# Patient Record
Sex: FEMALE | Race: BLACK OR AFRICAN AMERICAN | NOT HISPANIC OR LATINO
[De-identification: names, ages, dates, MRNs, and addresses within clinical notes are randomized per-mention and may not be internally consistent; named-entity substitution may affect disease eponyms.]

---

## 2019-06-05 ENCOUNTER — RESULT REVIEW (OUTPATIENT)
Age: 34
End: 2019-06-05

## 2019-06-20 ENCOUNTER — APPOINTMENT (OUTPATIENT)
Dept: HEPATOLOGY | Facility: CLINIC | Age: 34
End: 2019-06-20

## 2019-06-20 PROBLEM — Z00.00 ENCOUNTER FOR PREVENTIVE HEALTH EXAMINATION: Status: ACTIVE | Noted: 2019-06-20

## 2019-07-01 ENCOUNTER — APPOINTMENT (OUTPATIENT)
Dept: HEPATOLOGY | Facility: CLINIC | Age: 34
End: 2019-07-01
Payer: MEDICAID

## 2019-07-01 VITALS
RESPIRATION RATE: 16 BRPM | HEIGHT: 63 IN | BODY MASS INDEX: 26.22 KG/M2 | DIASTOLIC BLOOD PRESSURE: 78 MMHG | SYSTOLIC BLOOD PRESSURE: 119 MMHG | TEMPERATURE: 98.3 F | HEART RATE: 99 BPM | OXYGEN SATURATION: 98 % | WEIGHT: 148 LBS

## 2019-07-01 DIAGNOSIS — Z86.2 PERSONAL HISTORY OF DISEASES OF THE BLOOD AND BLOOD-FORMING ORGANS AND CERTAIN DISORDERS INVOLVING THE IMMUNE MECHANISM: ICD-10-CM

## 2019-07-01 PROCEDURE — 99203 OFFICE O/P NEW LOW 30 MIN: CPT

## 2019-07-02 LAB
ALBUMIN SERPL ELPH-MCNC: 4.3 G/DL
ALP BLD-CCNC: 63 U/L
ALT SERPL-CCNC: 25 U/L
ANION GAP SERPL CALC-SCNC: 11 MMOL/L
AST SERPL-CCNC: 20 U/L
BASOPHILS # BLD AUTO: 0.04 K/UL
BASOPHILS NFR BLD AUTO: 0.4 %
BILIRUB SERPL-MCNC: 0.2 MG/DL
BUN SERPL-MCNC: 9 MG/DL
CALCIUM SERPL-MCNC: 9.3 MG/DL
CHLORIDE SERPL-SCNC: 105 MMOL/L
CO2 SERPL-SCNC: 21 MMOL/L
CREAT SERPL-MCNC: 0.51 MG/DL
EOSINOPHIL # BLD AUTO: 0.09 K/UL
EOSINOPHIL NFR BLD AUTO: 0.9 %
GLUCOSE SERPL-MCNC: 84 MG/DL
HBV CORE IGG+IGM SER QL: REACTIVE
HBV DNA # SERPL NAA+PROBE: 186 IU/ML
HBV SURFACE AB SER QL: NONREACTIVE
HBV SURFACE AG SER QL: REACTIVE
HCT VFR BLD CALC: 35.3 %
HEPATITIS A IGG ANTIBODY: REACTIVE
HEPB DNA PCR LOG: 2.27
HGB BLD-MCNC: 10.7 G/DL
IMM GRANULOCYTES NFR BLD AUTO: 1.3 %
LYMPHOCYTES # BLD AUTO: 1.66 K/UL
LYMPHOCYTES NFR BLD AUTO: 17.2 %
MAN DIFF?: NORMAL
MCHC RBC-ENTMCNC: 23.6 PG
MCHC RBC-ENTMCNC: 30.3 GM/DL
MCV RBC AUTO: 77.9 FL
MONOCYTES # BLD AUTO: 1.57 K/UL
MONOCYTES NFR BLD AUTO: 16.2 %
NEUTROPHILS # BLD AUTO: 6.18 K/UL
NEUTROPHILS NFR BLD AUTO: 64 %
PLATELET # BLD AUTO: 361 K/UL
POTASSIUM SERPL-SCNC: 4.1 MMOL/L
PROT SERPL-MCNC: 7.2 G/DL
RBC # BLD: 4.53 M/UL
RBC # FLD: 17 %
SODIUM SERPL-SCNC: 137 MMOL/L
WBC # FLD AUTO: 9.67 K/UL

## 2019-07-03 LAB
HBV E AB SER QL: POSITIVE
HBV E AG SER QL: NEGATIVE

## 2019-07-08 LAB — HDV AB SER IA-ACNC: NEGATIVE

## 2019-07-09 ENCOUNTER — ASOB RESULT (OUTPATIENT)
Age: 34
End: 2019-07-09

## 2019-07-09 ENCOUNTER — APPOINTMENT (OUTPATIENT)
Dept: ANTEPARTUM | Facility: CLINIC | Age: 34
End: 2019-07-09
Payer: MEDICAID

## 2019-07-09 PROCEDURE — 76813 OB US NUCHAL MEAS 1 GEST: CPT

## 2019-07-09 PROCEDURE — 36416 COLLJ CAPILLARY BLOOD SPEC: CPT

## 2019-07-09 PROCEDURE — 76801 OB US < 14 WKS SINGLE FETUS: CPT

## 2019-07-12 ENCOUNTER — APPOINTMENT (OUTPATIENT)
Dept: ULTRASOUND IMAGING | Facility: HOSPITAL | Age: 34
End: 2019-07-12

## 2019-08-07 ENCOUNTER — ASOB RESULT (OUTPATIENT)
Age: 34
End: 2019-08-07

## 2019-08-07 ENCOUNTER — APPOINTMENT (OUTPATIENT)
Dept: ANTEPARTUM | Facility: CLINIC | Age: 34
End: 2019-08-07
Payer: MEDICAID

## 2019-08-07 ENCOUNTER — APPOINTMENT (OUTPATIENT)
Dept: ANTEPARTUM | Facility: CLINIC | Age: 34
End: 2019-08-07

## 2019-08-07 PROCEDURE — 99241 OFFICE CONSULTATION NEW/ESTAB PATIENT 15 MIN: CPT | Mod: 25

## 2019-08-07 PROCEDURE — 36415 COLL VENOUS BLD VENIPUNCTURE: CPT

## 2019-08-20 ENCOUNTER — APPOINTMENT (OUTPATIENT)
Dept: ULTRASOUND IMAGING | Facility: CLINIC | Age: 34
End: 2019-08-20

## 2019-08-22 ENCOUNTER — FORM ENCOUNTER (OUTPATIENT)
Age: 34
End: 2019-08-22

## 2019-08-23 ENCOUNTER — OUTPATIENT (OUTPATIENT)
Dept: OUTPATIENT SERVICES | Facility: HOSPITAL | Age: 34
LOS: 1 days | End: 2019-08-23
Payer: COMMERCIAL

## 2019-08-23 ENCOUNTER — APPOINTMENT (OUTPATIENT)
Dept: ULTRASOUND IMAGING | Facility: CLINIC | Age: 34
End: 2019-08-23
Payer: MEDICAID

## 2019-08-23 DIAGNOSIS — B18.1 CHRONIC VIRAL HEPATITIS B WITHOUT DELTA-AGENT: ICD-10-CM

## 2019-08-23 PROCEDURE — 76700 US EXAM ABDOM COMPLETE: CPT | Mod: 26

## 2019-08-23 PROCEDURE — 76700 US EXAM ABDOM COMPLETE: CPT

## 2019-08-27 ENCOUNTER — ASOB RESULT (OUTPATIENT)
Age: 34
End: 2019-08-27

## 2019-08-27 ENCOUNTER — APPOINTMENT (OUTPATIENT)
Dept: ANTEPARTUM | Facility: CLINIC | Age: 34
End: 2019-08-27
Payer: MEDICAID

## 2019-08-27 PROCEDURE — 76811 OB US DETAILED SNGL FETUS: CPT

## 2019-09-04 ENCOUNTER — ASOB RESULT (OUTPATIENT)
Age: 34
End: 2019-09-04

## 2019-09-04 ENCOUNTER — APPOINTMENT (OUTPATIENT)
Dept: ANTEPARTUM | Facility: CLINIC | Age: 34
End: 2019-09-04
Payer: MEDICAID

## 2019-09-04 PROCEDURE — 76815 OB US LIMITED FETUS(S): CPT

## 2019-09-04 PROCEDURE — 93975 VASCULAR STUDY: CPT

## 2019-10-30 ENCOUNTER — APPOINTMENT (OUTPATIENT)
Dept: ANTEPARTUM | Facility: CLINIC | Age: 34
End: 2019-10-30
Payer: MEDICAID

## 2019-10-30 ENCOUNTER — ASOB RESULT (OUTPATIENT)
Age: 34
End: 2019-10-30

## 2019-10-30 PROCEDURE — 99242 OFF/OP CONSLTJ NEW/EST SF 20: CPT | Mod: 25

## 2019-10-30 PROCEDURE — 99212 OFFICE O/P EST SF 10 MIN: CPT | Mod: 25

## 2019-10-30 PROCEDURE — 76819 FETAL BIOPHYS PROFIL W/O NST: CPT

## 2019-10-30 PROCEDURE — 76816 OB US FOLLOW-UP PER FETUS: CPT

## 2019-12-04 ENCOUNTER — APPOINTMENT (OUTPATIENT)
Dept: HEPATOLOGY | Facility: CLINIC | Age: 34
End: 2019-12-04
Payer: MEDICAID

## 2019-12-04 ENCOUNTER — LABORATORY RESULT (OUTPATIENT)
Age: 34
End: 2019-12-04

## 2019-12-04 VITALS
BODY MASS INDEX: 31.71 KG/M2 | DIASTOLIC BLOOD PRESSURE: 72 MMHG | HEART RATE: 109 BPM | WEIGHT: 179 LBS | TEMPERATURE: 98.4 F | HEIGHT: 63 IN | SYSTOLIC BLOOD PRESSURE: 123 MMHG | RESPIRATION RATE: 16 BRPM

## 2019-12-04 DIAGNOSIS — Z3A.34 34 WEEKS GESTATION OF PREGNANCY: ICD-10-CM

## 2019-12-04 PROCEDURE — 99213 OFFICE O/P EST LOW 20 MIN: CPT

## 2019-12-05 PROBLEM — Z3A.34 34 WEEKS GESTATION OF PREGNANCY: Status: ACTIVE | Noted: 2019-12-05

## 2019-12-05 LAB
ALBUMIN SERPL ELPH-MCNC: 3.7 G/DL
ALP BLD-CCNC: 97 U/L
ALT SERPL-CCNC: 25 U/L
ANION GAP SERPL CALC-SCNC: 12 MMOL/L
AST SERPL-CCNC: 20 U/L
BASOPHILS # BLD AUTO: 0.07 K/UL
BASOPHILS NFR BLD AUTO: 0.9 %
BILIRUB SERPL-MCNC: 0.3 MG/DL
BUN SERPL-MCNC: 5 MG/DL
CALCIUM SERPL-MCNC: 9.7 MG/DL
CHLORIDE SERPL-SCNC: 105 MMOL/L
CO2 SERPL-SCNC: 20 MMOL/L
CREAT SERPL-MCNC: 0.49 MG/DL
EOSINOPHIL # BLD AUTO: 0 K/UL
EOSINOPHIL NFR BLD AUTO: 0 %
GLUCOSE SERPL-MCNC: 85 MG/DL
HBV CORE IGG+IGM SER QL: REACTIVE
HBV DNA # SERPL NAA+PROBE: 22 IU/ML
HBV SURFACE AB SER QL: NONREACTIVE
HBV SURFACE AG SER QL: REACTIVE
HCT VFR BLD CALC: 38.8 %
HEPB DNA PCR LOG: 1.34 LOG10IU/ML
HGB BLD-MCNC: 11.7 G/DL
LYMPHOCYTES # BLD AUTO: 1.84 K/UL
LYMPHOCYTES NFR BLD AUTO: 23.9 %
MAN DIFF?: NORMAL
MCHC RBC-ENTMCNC: 25.3 PG
MCHC RBC-ENTMCNC: 30.2 GM/DL
MCV RBC AUTO: 84 FL
MONOCYTES # BLD AUTO: 1.97 K/UL
MONOCYTES NFR BLD AUTO: 25.6 %
NEUTROPHILS # BLD AUTO: 3.69 K/UL
NEUTROPHILS NFR BLD AUTO: 47.9 %
PLATELET # BLD AUTO: 220 K/UL
POTASSIUM SERPL-SCNC: 4.3 MMOL/L
PROT SERPL-MCNC: 6.8 G/DL
RBC # BLD: 4.62 M/UL
RBC # FLD: 17.8 %
SODIUM SERPL-SCNC: 137 MMOL/L
WBC # FLD AUTO: 7.71 K/UL

## 2019-12-11 ENCOUNTER — ASOB RESULT (OUTPATIENT)
Age: 34
End: 2019-12-11

## 2019-12-11 ENCOUNTER — APPOINTMENT (OUTPATIENT)
Dept: ANTEPARTUM | Facility: CLINIC | Age: 34
End: 2019-12-11
Payer: MEDICAID

## 2019-12-11 PROCEDURE — 99213 OFFICE O/P EST LOW 20 MIN: CPT | Mod: 25

## 2019-12-11 PROCEDURE — 76816 OB US FOLLOW-UP PER FETUS: CPT

## 2019-12-23 ENCOUNTER — APPOINTMENT (OUTPATIENT)
Dept: CARDIOLOGY | Facility: CLINIC | Age: 34
End: 2019-12-23
Payer: MEDICAID

## 2019-12-23 ENCOUNTER — NON-APPOINTMENT (OUTPATIENT)
Age: 34
End: 2019-12-23

## 2019-12-23 ENCOUNTER — APPOINTMENT (OUTPATIENT)
Dept: PULMONOLOGY | Facility: CLINIC | Age: 34
End: 2019-12-23
Payer: MEDICAID

## 2019-12-23 VITALS
HEIGHT: 63 IN | WEIGHT: 183 LBS | TEMPERATURE: 98 F | RESPIRATION RATE: 15 BRPM | SYSTOLIC BLOOD PRESSURE: 116 MMHG | BODY MASS INDEX: 32.43 KG/M2 | HEART RATE: 108 BPM | DIASTOLIC BLOOD PRESSURE: 69 MMHG | OXYGEN SATURATION: 96 %

## 2019-12-23 VITALS
SYSTOLIC BLOOD PRESSURE: 118 MMHG | DIASTOLIC BLOOD PRESSURE: 68 MMHG | WEIGHT: 182 LBS | HEIGHT: 63 IN | HEART RATE: 100 BPM | BODY MASS INDEX: 32.25 KG/M2 | OXYGEN SATURATION: 98 % | RESPIRATION RATE: 16 BRPM

## 2019-12-23 DIAGNOSIS — R06.02 SHORTNESS OF BREATH: ICD-10-CM

## 2019-12-23 PROCEDURE — 99204 OFFICE O/P NEW MOD 45 MIN: CPT

## 2019-12-23 PROCEDURE — 93306 TTE W/DOPPLER COMPLETE: CPT

## 2019-12-23 PROCEDURE — 93000 ELECTROCARDIOGRAM COMPLETE: CPT

## 2019-12-23 NOTE — CONSULT LETTER
[Dear  ___] : Dear  [unfilled], [Consult Letter:] : I had the pleasure of evaluating your patient, [unfilled]. [Consult Closing:] : Thank you very much for allowing me to participate in the care of this patient.  If you have any questions, please do not hesitate to contact me. [Please see my note below.] : Please see my note below. [Sincerely,] : Sincerely, [FreeTextEntry3] : Cindy Mata MD\par Pulmonary, Critical Care, and Sleep Medicine\par  of Medicine\par [employer] [facility]

## 2019-12-23 NOTE — REVIEW OF SYSTEMS
[Dyspnea] : dyspnea [As Noted in HPI] : as noted in HPI [Negative] : Pulmonary Hypertension [Cough] : no cough [Sputum] : not coughing up ~M sputum [Chest Tightness] : no chest tightness [Pleuritic Pain] : no pleuritic pain [Wheezing] : no wheezing [Dizziness] : no dizziness [Headache] : no headache [Syncope] : no fainting

## 2019-12-23 NOTE — PHYSICAL EXAM
[Clean] : clean [Dry] : dry [Well-Healed] : well-healed [General Appearance - Well Nourished] : well nourished [General Appearance - Well Developed] : well developed [Normal Appearance] : normal appearance [Well Groomed] : well groomed [General Appearance - In No Acute Distress] : no acute distress [No Deformities] : no deformities [Eyelids - No Xanthelasma] : the eyelids demonstrated no xanthelasmas [Normal Conjunctiva] : the conjunctiva exhibited no abnormalities [No Oral Pallor] : no oral pallor [Normal Oral Mucosa] : normal oral mucosa [No Oral Cyanosis] : no oral cyanosis [Normal Jugular Venous V Waves Present] : normal jugular venous V waves present [Normal Jugular Venous A Waves Present] : normal jugular venous A waves present [No Jugular Venous Issa A Waves] : no jugular venous issa A waves [Heart Rate And Rhythm] : heart rate and rhythm were normal [Murmurs] : no murmurs present [Heart Sounds] : normal S1 and S2 [Respiration, Rhythm And Depth] : normal respiratory rhythm and effort [Abdomen Soft] : soft [Auscultation Breath Sounds / Voice Sounds] : lungs were clear to auscultation bilaterally [Exaggerated Use Of Accessory Muscles For Inspiration] : no accessory muscle use [Abdomen Tenderness] : non-tender [Abdomen Mass (___ Cm)] : no abdominal mass palpated [Abnormal Walk] : normal gait [Gait - Sufficient For Exercise Testing] : the gait was sufficient for exercise testing [Cyanosis, Localized] : no localized cyanosis [Nail Clubbing] : no clubbing of the fingernails [Petechial Hemorrhages (___cm)] : no petechial hemorrhages [Skin Color & Pigmentation] : normal skin color and pigmentation [No Venous Stasis] : no venous stasis [] : no rash [No Skin Ulcers] : no skin ulcer [Skin Lesions] : no skin lesions [No Xanthoma] : no  xanthoma was observed [Oriented To Time, Place, And Person] : oriented to person, place, and time [Affect] : the affect was normal [No Anxiety] : not feeling anxious [Mood] : the mood was normal [FreeTextEntry1] : Regular rate and rhythm, NL S1, S2, non-displaced PMI, chest non-tender; no rubs,heaves  or gallops a  Grade 1/6 systolic murmur noted at the LSB\par

## 2019-12-23 NOTE — PROCEDURE
[FreeTextEntry1] : Room air at rest 98%.\par Room air with ambulation 98%.\par \par POCUS:\par Chest - Alines throughout. No pleural effusions or consolidations bilaterally. Diaphragm is high near axilla due to gravid uterus. Normal LV function with no septal flattening or sign of RV overload.\par DVT study - Fully compressible femoral vein bilaterally at CFV at site of CFA, saphenous vein junction, and deep femoral vein split, as well as popliteal vein

## 2019-12-23 NOTE — PHYSICAL EXAM
[General Appearance - Well Developed] : well developed [Normal Appearance] : normal appearance [Well Groomed] : well groomed [General Appearance - Well Nourished] : well nourished [No Deformities] : no deformities [General Appearance - In No Acute Distress] : no acute distress [Normal Conjunctiva] : the conjunctiva exhibited no abnormalities [Neck Appearance] : the appearance of the neck was normal [Murmurs] : no murmurs present [Heart Rate And Rhythm] : heart rate and rhythm were normal [Arterial Pulses Normal] : the arterial pulses were normal [Respiration, Rhythm And Depth] : normal respiratory rhythm and effort [Exaggerated Use Of Accessory Muscles For Inspiration] : no accessory muscle use [Auscultation Breath Sounds / Voice Sounds] : lungs were clear to auscultation bilaterally [Gait - Sufficient For Exercise Testing] : the gait was sufficient for exercise testing [Abnormal Walk] : normal gait [Nail Splinter Hemorrhages] : no splinter hemorrhages of the nails [Nail Clubbing] : no clubbing of the fingernails [Petechial Hemorrhages (___cm)] : no petechial hemorrhages [Skin Turgor] : normal skin turgor [No Focal Deficits] : no focal deficits [] : no rash [Skin Color & Pigmentation] : normal skin color and pigmentation [Impaired Insight] : insight and judgment were intact [Oriented To Time, Place, And Person] : oriented to person, place, and time [Mood] : the mood was normal [Affect] : the affect was normal [FreeTextEntry1] : Gravd uterus [FreeTextEntry2] : No edema

## 2019-12-23 NOTE — REASON FOR VISIT
[FreeTextEntry1] : pt is 34 weeks  pregnant  female who is SOB since October . pt states she sleeping sitting up for the last several weeks.  Patient states that she had no symptoms like this during her first pregnancy.  She denies substernal chest pain palpitations,  or leg edema.\par . pt has had normal OB / GYN  visits

## 2019-12-23 NOTE — HISTORY OF PRESENT ILLNESS
[FreeTextEntry1] : 34 year old female referred by Dr. Londono for evaluation of dyspnea.\par \par Since October. \par Patient is pregnant, 34 weeks gestation, due date . This is patient's second pregnancy - first pregnancy was 12 years ago, no complication during that pregnancy but was delivered emergently at 36 weeks via  due to  distress.\par \par She has had no medical complications during this pregnancy but reports dyspnea since October. No significant inciting factor but with progressively worsening dyspnea. No chest pain, no fever, no leg edema, no wheezing, no coughing, no dizziness, no lightheadedness. Has difficulty lying flat. Last travel was in May to [facility], 15 hour travel in total, in two 7 hour trips with a layover. No dyspnea around that trip. Last visit with OB was on  - fetal heart rate was fine. Last fetal US , baby weighed about 6 lbs at that time. Has CBC check  which showed Hgb of 11.7.\par

## 2019-12-23 NOTE — PHYSICAL EXAM
[Clean] : clean [Dry] : dry [Well-Healed] : well-healed [General Appearance - Well Developed] : well developed [Well Groomed] : well groomed [General Appearance - Well Nourished] : well nourished [Normal Appearance] : normal appearance [General Appearance - In No Acute Distress] : no acute distress [No Deformities] : no deformities [Eyelids - No Xanthelasma] : the eyelids demonstrated no xanthelasmas [Normal Conjunctiva] : the conjunctiva exhibited no abnormalities [Normal Oral Mucosa] : normal oral mucosa [No Oral Pallor] : no oral pallor [Normal Jugular Venous V Waves Present] : normal jugular venous V waves present [No Oral Cyanosis] : no oral cyanosis [Normal Jugular Venous A Waves Present] : normal jugular venous A waves present [No Jugular Venous Issa A Waves] : no jugular venous issa A waves [Heart Rate And Rhythm] : heart rate and rhythm were normal [Heart Sounds] : normal S1 and S2 [Murmurs] : no murmurs present [Respiration, Rhythm And Depth] : normal respiratory rhythm and effort [Abdomen Soft] : soft [Exaggerated Use Of Accessory Muscles For Inspiration] : no accessory muscle use [Auscultation Breath Sounds / Voice Sounds] : lungs were clear to auscultation bilaterally [Abdomen Tenderness] : non-tender [Gait - Sufficient For Exercise Testing] : the gait was sufficient for exercise testing [Abnormal Walk] : normal gait [Abdomen Mass (___ Cm)] : no abdominal mass palpated [Nail Clubbing] : no clubbing of the fingernails [Cyanosis, Localized] : no localized cyanosis [Petechial Hemorrhages (___cm)] : no petechial hemorrhages [No Venous Stasis] : no venous stasis [] : no rash [Skin Color & Pigmentation] : normal skin color and pigmentation [No Skin Ulcers] : no skin ulcer [Skin Lesions] : no skin lesions [Oriented To Time, Place, And Person] : oriented to person, place, and time [Affect] : the affect was normal [No Xanthoma] : no  xanthoma was observed [No Anxiety] : not feeling anxious [Mood] : the mood was normal [FreeTextEntry1] : Regular rate and rhythm, NL S1, S2, non-displaced PMI, chest non-tender; no rubs,heaves  or gallops a  Grade 1/6 systolic murmur noted at the LSB\par

## 2019-12-23 NOTE — ASSESSMENT
[FreeTextEntry1] : 34 year old female, 34 weeks gestation with dyspnea. \par No significant anemia noted on recent labs. No wheezing to suggest airway disease. POCUS negative for pulmonary edema, pleural effusions, or consolidations. No sign of RV overload. DVT study negative. Given negative DVT, with no evidence of RV overload and normal oxygen saturation, have low clinical suspicion for PE. I suspect her dyspnea is related to pregnancy. Recommended to avoid supine sleep, and keep on left side if possible. If she has worsening of symptoms or develops chest pain, dizziness, lightheadedness, etc. She was advised to go to the ED.\par \par She can follow up with me on an as needed basis.

## 2020-01-08 ENCOUNTER — APPOINTMENT (OUTPATIENT)
Dept: ANTEPARTUM | Facility: CLINIC | Age: 35
End: 2020-01-08
Payer: MEDICAID

## 2020-01-08 ENCOUNTER — ASOB RESULT (OUTPATIENT)
Age: 35
End: 2020-01-08

## 2020-01-08 PROCEDURE — 76816 OB US FOLLOW-UP PER FETUS: CPT

## 2020-01-08 PROCEDURE — 76819 FETAL BIOPHYS PROFIL W/O NST: CPT

## 2020-01-13 ENCOUNTER — OUTPATIENT (OUTPATIENT)
Dept: OUTPATIENT SERVICES | Facility: HOSPITAL | Age: 35
LOS: 1 days | End: 2020-01-13

## 2020-01-13 VITALS
OXYGEN SATURATION: 98 % | HEART RATE: 98 BPM | TEMPERATURE: 98 F | DIASTOLIC BLOOD PRESSURE: 60 MMHG | HEIGHT: 65 IN | SYSTOLIC BLOOD PRESSURE: 110 MMHG | RESPIRATION RATE: 16 BRPM | WEIGHT: 184.09 LBS

## 2020-01-13 DIAGNOSIS — Z98.891 HISTORY OF UTERINE SCAR FROM PREVIOUS SURGERY: ICD-10-CM

## 2020-01-13 DIAGNOSIS — O34.219 MATERNAL CARE FOR UNSPECIFIED TYPE SCAR FROM PREVIOUS CESAREAN DELIVERY: ICD-10-CM

## 2020-01-13 DIAGNOSIS — Z98.891 HISTORY OF UTERINE SCAR FROM PREVIOUS SURGERY: Chronic | ICD-10-CM

## 2020-01-13 LAB
APPEARANCE UR: SIGNIFICANT CHANGE UP
BACTERIA # UR AUTO: HIGH
BILIRUB UR-MCNC: NEGATIVE — SIGNIFICANT CHANGE UP
BLD GP AB SCN SERPL QL: NEGATIVE — SIGNIFICANT CHANGE UP
BLOOD UR QL VISUAL: NEGATIVE — SIGNIFICANT CHANGE UP
COLOR SPEC: SIGNIFICANT CHANGE UP
GLUCOSE UR-MCNC: NEGATIVE — SIGNIFICANT CHANGE UP
HCT VFR BLD CALC: 38.7 % — SIGNIFICANT CHANGE UP (ref 34.5–45)
HGB BLD-MCNC: 12.6 G/DL — SIGNIFICANT CHANGE UP (ref 11.5–15.5)
HYALINE CASTS # UR AUTO: NEGATIVE — SIGNIFICANT CHANGE UP
KETONES UR-MCNC: NEGATIVE — SIGNIFICANT CHANGE UP
LEUKOCYTE ESTERASE UR-ACNC: NEGATIVE — SIGNIFICANT CHANGE UP
MCHC RBC-ENTMCNC: 26 PG — LOW (ref 27–34)
MCHC RBC-ENTMCNC: 32.6 % — SIGNIFICANT CHANGE UP (ref 32–36)
MCV RBC AUTO: 80 FL — SIGNIFICANT CHANGE UP (ref 80–100)
NITRITE UR-MCNC: NEGATIVE — SIGNIFICANT CHANGE UP
NRBC # FLD: 0 K/UL — SIGNIFICANT CHANGE UP (ref 0–0)
PH UR: 6.5 — SIGNIFICANT CHANGE UP (ref 5–8)
PLATELET # BLD AUTO: 218 K/UL — SIGNIFICANT CHANGE UP (ref 150–400)
PMV BLD: 9.3 FL — SIGNIFICANT CHANGE UP (ref 7–13)
PROT UR-MCNC: NEGATIVE — SIGNIFICANT CHANGE UP
RBC # BLD: 4.84 M/UL — SIGNIFICANT CHANGE UP (ref 3.8–5.2)
RBC # FLD: 15.7 % — HIGH (ref 10.3–14.5)
RBC CASTS # UR COMP ASSIST: SIGNIFICANT CHANGE UP (ref 0–?)
RH IG SCN BLD-IMP: POSITIVE — SIGNIFICANT CHANGE UP
SP GR SPEC: 1.01 — SIGNIFICANT CHANGE UP (ref 1–1.04)
SQUAMOUS # UR AUTO: SIGNIFICANT CHANGE UP
UROBILINOGEN FLD QL: NORMAL — SIGNIFICANT CHANGE UP
WBC # BLD: 6.88 K/UL — SIGNIFICANT CHANGE UP (ref 3.8–10.5)
WBC # FLD AUTO: 6.88 K/UL — SIGNIFICANT CHANGE UP (ref 3.8–10.5)
WBC UR QL: HIGH (ref 0–?)

## 2020-01-13 NOTE — OB PST NOTE - ASSESSMENT
35 y/o female with hx of  section due to failed induction.  Now with  EDC 20, scheduled for Repeat  section 20.

## 2020-01-13 NOTE — OB PST NOTE - PAIN CHRONIC, PROFILE
yes/lower back pain for past 5 months per pt. Per pt, she was advised yes/lower back pain for past 5 months per pt. Pt reports pain level goes up to 9 at times for past 5 months

## 2020-01-13 NOTE — OB PST NOTE - NSHPREVIEWOFSYSTEMS_GEN_ALL_CORE
General: No fever, chills, sweating, anorexia, weight loss or weight gain. No polyphagia, polyurea, polydypsia, malaise, or fatigue    Skin: No rashes, itching, or dryness. No change in size/color of moles. No tumors, brittle nails, pitted nails, or hair loss    Breast: No tenderness, lumps, or nipple discharge      Ophthalmologic: No diplopia, photophobia, lacrimation, blurred Vision , or eye discharge    ENMT Symptoms: No hearing difficulty, ear pain, tinnitus, or vertigo. No sinus symptoms, nasal congestion, nasal   discharge, or nasal obstruction    Respiratory and Thorax: No wheezing, dyspnea, cough, hemoptysis, or pleuritic chest pPain     Cardiovascular: No chest pain, palpitations, dyspnea on exertion, orthopnea, paroxysmal nocturnal dyspnea,   peripheral edema, or claudication    Gastrointestinal: No nausea, vomiting, diarrhea, constipation, change in bowel habits, flatulence, abdominal pain, or melena    Genitourinary/ Pelvis: No hematuria, renal colic, or flank pain.  No urine discoloration, incontinence, dysuria, or urinary hesitancy. Normal urinary frequency. No nocturia, abnormal vaginal bleeding, vaginal discharge, spotting, pelvic pain, or vaginal leakage    Musculoskeletal:  Pt reports back pain for past 5 months. no joint swelling, muscle cramping, muscle weakness, neck pain, arm pain, or leg pain    Neurological: No transient paralysis, weakness, paresthesias, or seizures. No syncope, tremors, vertigo, loss of sensation, difficulty walking, loss of consciousness, hemiparesis, confusion, or facial palsy    Psychiatric: No suicidal ideation, depression, anxiety, insomnia, memory loss, paranoia, mood swings, agitation, hallucinations, or hyperactivity    Hematology: No gum bleeding, nose bleeding, or skin lumps    Lymphatic: No enlarged or tender lymph nodes. No extremity swelling    Endocrine: No heat or cold intolerance    Immunologic: No recurrent or persistent infections General: No fever, chills, sweating, anorexia, weight loss or weight gain. No polyphagia, polyurea, polydypsia, malaise, or fatigue    Skin: No rashes, itching, or dryness. No change in size/color of moles. No tumors, brittle nails, pitted nails, or hair loss    Breast: No tenderness, lumps, or nipple discharge      Ophthalmologic: No diplopia, photophobia, lacrimation, blurred Vision , or eye discharge    ENMT Symptoms: No hearing difficulty, ear pain, tinnitus, or vertigo. No sinus symptoms, nasal congestion, nasal   discharge, or nasal obstruction    Respiratory and Thorax: No wheezing, dyspnea, cough, hemoptysis, or pleuritic chest pPain     Cardiovascular: No chest pain, palpitations, dyspnea on exertion, orthopnea, paroxysmal nocturnal dyspnea,   peripheral edema, or claudication    Gastrointestinal: No nausea, vomiting, diarrhea, constipation, change in bowel habits, flatulence, abdominal pain, or melena    Genitourinary/ Pelvis: No hematuria, renal colic, or flank pain.  No urine discoloration, incontinence, dysuria, or urinary hesitancy. Normal urinary frequency. No nocturia, abnormal vaginal bleeding, vaginal discharge, spotting, pelvic pain, or vaginal leakage    Musculoskeletal:  Pt reports chronic lower back pain for past 5 months. no joint swelling, muscle cramping, muscle weakness, neck pain, arm pain, or leg pain    Neurological: No transient paralysis, weakness, paresthesias, or seizures. No syncope, tremors, vertigo, loss of sensation, difficulty walking, loss of consciousness, hemiparesis, confusion, or facial palsy    Psychiatric: No suicidal ideation, depression, anxiety, insomnia, memory loss, paranoia, mood swings, agitation, hallucinations, or hyperactivity    Hematology: No gum bleeding, nose bleeding, or skin lumps    Lymphatic: No enlarged or tender lymph nodes. No extremity swelling    Endocrine: No heat or cold intolerance    Immunologic: No recurrent or persistent infections

## 2020-01-13 NOTE — OB PST NOTE - HISTORY OF PRESENT ILLNESS
33 y/o female with hx of  section due to failed induction.  Now with  EDC22, scheduled for Repeat  section 20.     Pt reports she feels baby moving today as usual 35 y/o female with hx of  section due to failed induction.  Now with  EDC 20, scheduled for Repeat  section 20.     Pt reports she feels baby moving today as usual

## 2020-01-13 NOTE — OB PST NOTE - PAIN LOCATION
chronic lower back pain, right hip pain x5 months.  Pt reports OB aware. chronic lower back pain, right hip pain x5 months.  Pt reports OB aware., and advises pt to take Tylenol for pain

## 2020-01-13 NOTE — OB PST NOTE - PROBLEM SELECTOR PLAN 1
Repeat  section 20.     CBC RPR T&S UA    Preop instructions and antibacterial soap given and explained (verbal and written), with teach back.

## 2020-01-14 LAB — T PALLIDUM AB TITR SER: NEGATIVE — SIGNIFICANT CHANGE UP

## 2020-01-16 ENCOUNTER — TRANSCRIPTION ENCOUNTER (OUTPATIENT)
Age: 35
End: 2020-01-16

## 2020-01-17 ENCOUNTER — TRANSCRIPTION ENCOUNTER (OUTPATIENT)
Age: 35
End: 2020-01-17

## 2020-01-17 ENCOUNTER — INPATIENT (INPATIENT)
Facility: HOSPITAL | Age: 35
LOS: 2 days | Discharge: HOME | End: 2020-01-20
Attending: SPECIALIST | Admitting: SPECIALIST

## 2020-01-17 VITALS
WEIGHT: 186.95 LBS | HEIGHT: 65 IN | RESPIRATION RATE: 18 BRPM | DIASTOLIC BLOOD PRESSURE: 62 MMHG | TEMPERATURE: 98 F | SYSTOLIC BLOOD PRESSURE: 119 MMHG | HEART RATE: 88 BPM

## 2020-01-17 DIAGNOSIS — O34.219 MATERNAL CARE FOR UNSPECIFIED TYPE SCAR FROM PREVIOUS CESAREAN DELIVERY: ICD-10-CM

## 2020-01-17 DIAGNOSIS — Z98.890 OTHER SPECIFIED POSTPROCEDURAL STATES: Chronic | ICD-10-CM

## 2020-01-17 DIAGNOSIS — Z98.891 HISTORY OF UTERINE SCAR FROM PREVIOUS SURGERY: Chronic | ICD-10-CM

## 2020-01-17 LAB
BLD GP AB SCN SERPL QL: NEGATIVE — SIGNIFICANT CHANGE UP
HCT VFR BLD CALC: 38 % — SIGNIFICANT CHANGE UP (ref 34.5–45)
HCT VFR BLD CALC: 38.8 % — SIGNIFICANT CHANGE UP (ref 34.5–45)
HCT VFR BLD CALC: 38.9 % — SIGNIFICANT CHANGE UP (ref 34.5–45)
HGB BLD-MCNC: 12.2 G/DL — SIGNIFICANT CHANGE UP (ref 11.5–15.5)
HGB BLD-MCNC: 12.2 G/DL — SIGNIFICANT CHANGE UP (ref 11.5–15.5)
HGB BLD-MCNC: 12.4 G/DL — SIGNIFICANT CHANGE UP (ref 11.5–15.5)
MCHC RBC-ENTMCNC: 26.1 PG — LOW (ref 27–34)
MCHC RBC-ENTMCNC: 26.2 PG — LOW (ref 27–34)
MCHC RBC-ENTMCNC: 32 % — SIGNIFICANT CHANGE UP (ref 32–36)
MCHC RBC-ENTMCNC: 32.1 % — SIGNIFICANT CHANGE UP (ref 32–36)
MCV RBC AUTO: 81.5 FL — SIGNIFICANT CHANGE UP (ref 80–100)
MCV RBC AUTO: 81.7 FL — SIGNIFICANT CHANGE UP (ref 80–100)
NRBC # FLD: 0 K/UL — SIGNIFICANT CHANGE UP (ref 0–0)
NRBC # FLD: 0 K/UL — SIGNIFICANT CHANGE UP (ref 0–0)
PLATELET # BLD AUTO: 172 K/UL — SIGNIFICANT CHANGE UP (ref 150–400)
PLATELET # BLD AUTO: 203 K/UL — SIGNIFICANT CHANGE UP (ref 150–400)
PMV BLD: 9 FL — SIGNIFICANT CHANGE UP (ref 7–13)
PMV BLD: 9.4 FL — SIGNIFICANT CHANGE UP (ref 7–13)
RBC # BLD: 4.66 M/UL — SIGNIFICANT CHANGE UP (ref 3.8–5.2)
RBC # BLD: 4.75 M/UL — SIGNIFICANT CHANGE UP (ref 3.8–5.2)
RBC # FLD: 16 % — HIGH (ref 10.3–14.5)
RBC # FLD: 16 % — HIGH (ref 10.3–14.5)
RH IG SCN BLD-IMP: POSITIVE — SIGNIFICANT CHANGE UP
WBC # BLD: 6.14 K/UL — SIGNIFICANT CHANGE UP (ref 3.8–10.5)
WBC # BLD: 8.05 K/UL — SIGNIFICANT CHANGE UP (ref 3.8–10.5)
WBC # FLD AUTO: 6.14 K/UL — SIGNIFICANT CHANGE UP (ref 3.8–10.5)
WBC # FLD AUTO: 8.05 K/UL — SIGNIFICANT CHANGE UP (ref 3.8–10.5)

## 2020-01-17 RX ORDER — ACETAMINOPHEN 500 MG
975 TABLET ORAL EVERY 6 HOURS
Refills: 0 | Status: DISCONTINUED | OUTPATIENT
Start: 2020-01-17 | End: 2020-01-20

## 2020-01-17 RX ORDER — OXYTOCIN 10 UNIT/ML
333.33 VIAL (ML) INJECTION
Qty: 20 | Refills: 0 | Status: DISCONTINUED | OUTPATIENT
Start: 2020-01-17 | End: 2020-01-17

## 2020-01-17 RX ORDER — NALOXONE HYDROCHLORIDE 4 MG/.1ML
0.1 SPRAY NASAL
Refills: 0 | Status: DISCONTINUED | OUTPATIENT
Start: 2020-01-17 | End: 2020-01-19

## 2020-01-17 RX ORDER — SODIUM CHLORIDE 9 MG/ML
1000 INJECTION, SOLUTION INTRAVENOUS
Refills: 0 | Status: DISCONTINUED | OUTPATIENT
Start: 2020-01-17 | End: 2020-01-17

## 2020-01-17 RX ORDER — ONDANSETRON 8 MG/1
4 TABLET, FILM COATED ORAL EVERY 6 HOURS
Refills: 0 | Status: DISCONTINUED | OUTPATIENT
Start: 2020-01-17 | End: 2020-01-19

## 2020-01-17 RX ORDER — LANOLIN
1 OINTMENT (GRAM) TOPICAL EVERY 6 HOURS
Refills: 0 | Status: DISCONTINUED | OUTPATIENT
Start: 2020-01-17 | End: 2020-01-20

## 2020-01-17 RX ORDER — FAMOTIDINE 10 MG/ML
20 INJECTION INTRAVENOUS ONCE
Refills: 0 | Status: COMPLETED | OUTPATIENT
Start: 2020-01-17 | End: 2020-01-17

## 2020-01-17 RX ORDER — SIMETHICONE 80 MG/1
80 TABLET, CHEWABLE ORAL EVERY 4 HOURS
Refills: 0 | Status: DISCONTINUED | OUTPATIENT
Start: 2020-01-17 | End: 2020-01-20

## 2020-01-17 RX ORDER — OXYCODONE HYDROCHLORIDE 5 MG/1
10 TABLET ORAL
Refills: 0 | Status: DISCONTINUED | OUTPATIENT
Start: 2020-01-17 | End: 2020-01-19

## 2020-01-17 RX ORDER — CITRIC ACID/SODIUM CITRATE 300-500 MG
30 SOLUTION, ORAL ORAL ONCE
Refills: 0 | Status: COMPLETED | OUTPATIENT
Start: 2020-01-17 | End: 2020-01-17

## 2020-01-17 RX ORDER — ONDANSETRON 8 MG/1
4 TABLET, FILM COATED ORAL ONCE
Refills: 0 | Status: DISCONTINUED | OUTPATIENT
Start: 2020-01-17 | End: 2020-01-17

## 2020-01-17 RX ORDER — MAGNESIUM HYDROXIDE 400 MG/1
30 TABLET, CHEWABLE ORAL
Refills: 0 | Status: DISCONTINUED | OUTPATIENT
Start: 2020-01-17 | End: 2020-01-20

## 2020-01-17 RX ORDER — TETANUS TOXOID, REDUCED DIPHTHERIA TOXOID AND ACELLULAR PERTUSSIS VACCINE, ADSORBED 5; 2.5; 8; 8; 2.5 [IU]/.5ML; [IU]/.5ML; UG/.5ML; UG/.5ML; UG/.5ML
0.5 SUSPENSION INTRAMUSCULAR ONCE
Refills: 0 | Status: DISCONTINUED | OUTPATIENT
Start: 2020-01-17 | End: 2020-01-20

## 2020-01-17 RX ORDER — DIPHENHYDRAMINE HCL 50 MG
25 CAPSULE ORAL EVERY 6 HOURS
Refills: 0 | Status: DISCONTINUED | OUTPATIENT
Start: 2020-01-17 | End: 2020-01-20

## 2020-01-17 RX ORDER — OXYCODONE HYDROCHLORIDE 5 MG/1
5 TABLET ORAL
Refills: 0 | Status: DISCONTINUED | OUTPATIENT
Start: 2020-01-17 | End: 2020-01-20

## 2020-01-17 RX ORDER — METOCLOPRAMIDE HCL 10 MG
10 TABLET ORAL ONCE
Refills: 0 | Status: COMPLETED | OUTPATIENT
Start: 2020-01-17 | End: 2020-01-17

## 2020-01-17 RX ORDER — HEPARIN SODIUM 5000 [USP'U]/ML
5000 INJECTION INTRAVENOUS; SUBCUTANEOUS EVERY 12 HOURS
Refills: 0 | Status: DISCONTINUED | OUTPATIENT
Start: 2020-01-17 | End: 2020-01-20

## 2020-01-17 RX ORDER — ACETAMINOPHEN 500 MG
1 TABLET ORAL
Qty: 0 | Refills: 0 | DISCHARGE

## 2020-01-17 RX ORDER — KETOROLAC TROMETHAMINE 30 MG/ML
30 SYRINGE (ML) INJECTION EVERY 6 HOURS
Refills: 0 | Status: DISCONTINUED | OUTPATIENT
Start: 2020-01-17 | End: 2020-01-19

## 2020-01-17 RX ORDER — HYDROMORPHONE HYDROCHLORIDE 2 MG/ML
1 INJECTION INTRAMUSCULAR; INTRAVENOUS; SUBCUTANEOUS
Refills: 0 | Status: DISCONTINUED | OUTPATIENT
Start: 2020-01-17 | End: 2020-01-17

## 2020-01-17 RX ORDER — OXYCODONE HYDROCHLORIDE 5 MG/1
5 TABLET ORAL
Refills: 0 | Status: DISCONTINUED | OUTPATIENT
Start: 2020-01-17 | End: 2020-01-19

## 2020-01-17 RX ORDER — SODIUM CHLORIDE 9 MG/ML
1000 INJECTION, SOLUTION INTRAVENOUS ONCE
Refills: 0 | Status: COMPLETED | OUTPATIENT
Start: 2020-01-17 | End: 2020-01-17

## 2020-01-17 RX ORDER — GLYCERIN ADULT
1 SUPPOSITORY, RECTAL RECTAL AT BEDTIME
Refills: 0 | Status: DISCONTINUED | OUTPATIENT
Start: 2020-01-17 | End: 2020-01-20

## 2020-01-17 RX ORDER — HYDROMORPHONE HYDROCHLORIDE 2 MG/ML
0.5 INJECTION INTRAMUSCULAR; INTRAVENOUS; SUBCUTANEOUS
Refills: 0 | Status: DISCONTINUED | OUTPATIENT
Start: 2020-01-17 | End: 2020-01-17

## 2020-01-17 RX ORDER — ASPIRIN/CALCIUM CARB/MAGNESIUM 324 MG
1 TABLET ORAL
Qty: 0 | Refills: 0 | DISCHARGE

## 2020-01-17 RX ORDER — BUTORPHANOL TARTRATE 2 MG/ML
0.12 INJECTION, SOLUTION INTRAMUSCULAR; INTRAVENOUS EVERY 6 HOURS
Refills: 0 | Status: DISCONTINUED | OUTPATIENT
Start: 2020-01-17 | End: 2020-01-19

## 2020-01-17 RX ADMIN — Medication 30 MILLILITER(S): at 07:26

## 2020-01-17 RX ADMIN — HYDROMORPHONE HYDROCHLORIDE 1 MILLIGRAM(S): 2 INJECTION INTRAMUSCULAR; INTRAVENOUS; SUBCUTANEOUS at 10:52

## 2020-01-17 RX ADMIN — SODIUM CHLORIDE 2000 MILLILITER(S): 9 INJECTION, SOLUTION INTRAVENOUS at 06:35

## 2020-01-17 RX ADMIN — HEPARIN SODIUM 5000 UNIT(S): 5000 INJECTION INTRAVENOUS; SUBCUTANEOUS at 15:48

## 2020-01-17 RX ADMIN — Medication 30 MILLIGRAM(S): at 22:00

## 2020-01-17 RX ADMIN — Medication 10 MILLIGRAM(S): at 06:48

## 2020-01-17 RX ADMIN — Medication 30 MILLIGRAM(S): at 16:32

## 2020-01-17 RX ADMIN — Medication 30 MILLIGRAM(S): at 15:47

## 2020-01-17 RX ADMIN — Medication 30 MILLIGRAM(S): at 22:15

## 2020-01-17 RX ADMIN — SODIUM CHLORIDE 125 MILLILITER(S): 9 INJECTION, SOLUTION INTRAVENOUS at 07:00

## 2020-01-17 RX ADMIN — HYDROMORPHONE HYDROCHLORIDE 1 MILLIGRAM(S): 2 INJECTION INTRAMUSCULAR; INTRAVENOUS; SUBCUTANEOUS at 10:34

## 2020-01-17 RX ADMIN — FAMOTIDINE 20 MILLIGRAM(S): 10 INJECTION INTRAVENOUS at 06:48

## 2020-01-17 RX ADMIN — HYDROMORPHONE HYDROCHLORIDE 1 MILLIGRAM(S): 2 INJECTION INTRAMUSCULAR; INTRAVENOUS; SUBCUTANEOUS at 12:50

## 2020-01-17 RX ADMIN — HYDROMORPHONE HYDROCHLORIDE 1 MILLIGRAM(S): 2 INJECTION INTRAMUSCULAR; INTRAVENOUS; SUBCUTANEOUS at 12:37

## 2020-01-17 RX ADMIN — Medication 1000 MILLIUNIT(S)/MIN: at 10:34

## 2020-01-17 NOTE — OB NEONATOLOGY/PEDIATRICIAN DELIVERY SUMMARY - NSPEDSNEONOTESA_OBGYN_ALL_OB_FT
35 y/o mother Hep B carrier, and sickle cell trait, here for a repeat . baby at 40 weeks 1 day by dating. Mom blood type B+, pre- labs negatvie except for hep B. GBS was negative, but . No labor, no rupture. Clear fluid when ruptured at delivery. Delayed cord clamping for 30 seconds as baby came out crying. Audible secretions heard, so when baby was brought to NICU team, bulb suctioning as well as deep suctioning performed. Baby continued to cry but audible secretions still appreciated, and baby still cyanotic at 3 minutes of life. Placed on CPAP, and Pulse Ox. Placed on CPAP 100% FiO2 and was able to come down to 50% FiO2 at 6 minutes of life. Baby required CPAP for around 10 minutes, and when CPAP was removed, baby had retractions and still some audible secretions and some tachypnea. Baby placed prone at this time, with face turned to the side and CPAP mask applied. At 15 minutes of life, SpO2 90%, so FiO2 lowered to room air. Baby began to transition well, and retractions/tachypnea markedly improved. CPAP removed at 20 minutes of life, and baby placed supine. At this time, retractions resolved, no nasal flaring, and no audible secretions apprecaited, and sats remained at 93% with no support. Baby okay for Nursery. 35 y/o mother Hep B carrier, and sickle cell trait, here for a repeat . baby at 40 weeks 1 day by dating. Mom blood type B+, pre- labs negatvie except for hep B positive. GBS was negative, but . No labor, no rupture. Clear fluid when ruptured at delivery. Delayed cord clamping for 30 seconds as baby came out crying. Audible secretions heard, so when baby was brought to NICU team, bulb suctioning as well as deep suctioning performed. Baby continued to cry but audible secretions still appreciated, and baby still cyanotic at 3 minutes of life. Placed on CPAP, and Pulse Ox. Placed on CPAP 5 100% FiO2 and was able to come down to 50% FiO2 at 6 minutes of life. Baby required CPAP for around 20 minutes and weaned to room air.   comfortable on room air   mother wants to breast feed  baby will need Hep B vaccine and HBIG due to mothers HepB carrier state

## 2020-01-17 NOTE — OB RN PATIENT PROFILE - EXTENSIONS OF SELF_ADULT
Problem: Patient Care Overview  Goal: Plan of Care/Patient Progress Review  Discharge Planner SLP     Patient plan for discharge: TBD    Current status: Recommend: 1.) Puree solids 2.) Pudding thick liquids 3.) Upright for PO intake 4.) Remain upright for 30 minutes following meals to allow residue in pharynx to clear 5.) Provide small bites 6.) Allow Mickie to swallow/clear oral cavity before presenting next bite 6.) Monitor oral residue in buccal cavity following meals 7.) Discontinue oral intake with coughing    Mickie presents with severe oropharyngeal dysphagia characterized by poor oral transit, tongue thrust pattern and delayed swallow trigger resulting in oral residue and buccal pocketing. Mickie demonstrated 4-8 tongue thrust cycles in order to initiate her swallow. Due to severe oropharyngeal deficits, Mickie required 12 minutes to consume 4 oz of puree/pudding thick textures. Given Mickie's baseline medical diagnoses, severe dysphagia and clinical status she is at risk for aspiration.     Barriers to return to prior living situation: Ability to tolerate oral feeds, clinical status    Recommendations for discharge: Puree solids and pudding thick liquids. Follow swallow precautions and safe feeding strategies outlined above.    Rationale for recommendations: Severe oropharyngeal dysphagia status       Entered by: Marizol Brenner 02/16/2018 12:32 PM            car seat

## 2020-01-17 NOTE — DISCHARGE NOTE OB - MEDICATION SUMMARY - MEDICATIONS TO STOP TAKING
I will STOP taking the medications listed below when I get home from the hospital:    Ecotrin Adult Low Strength 81 mg oral delayed release tablet  -- 1 tab(s) by mouth once a day    Tylenol 500 mg oral tablet  -- 1 tab(s) by mouth every 6 hours, As Needed

## 2020-01-17 NOTE — OB PROVIDER H&P - NS_OBGYNHISTORY_OBGYN_ALL_OB_FT
2007 CS @36w failed induction/Cat II tracing female 6lb9oz   2 TOP one w/D&C    gyn: denies abn pap/std/hpv    GBS: negative

## 2020-01-17 NOTE — OB PROVIDER H&P - PMH
Miscarriage    Termination of pregnancy (fetus) Carrier of hepatitis B  1994 age 9 blood transfusion  High risk pregnancy with low PAPPA (pregnancy-associated plasma protein A)    Miscarriage    Termination of pregnancy (fetus)

## 2020-01-17 NOTE — OB RN PATIENT PROFILE - THE IMPORTANCE OF THE CORRECT PLACEMENT OF THE INFANT AND THE PARENT’S ABILITY TO ASSESS THE INFANT'S SKIN COLOR WHILE PLACED ON SKIN TO SKIN HAS BEEN REINFORCED.
02/09/17 0900   Team Members   Responding MD Lee   STAT RN Abby   STAT RT Edgar   Primary RN Jean-Pierre   Rapid Response/ Neuro Alert   Alert Type Rapid Response / STAT   Significant Events Patient with baseline AMS, increased confusion   Location / Unit at time of call 10S   Team Arrival 0920   End Time 0945   Primary Reason for Call Acute change in neuro status   Call initiated by Caregiver   Interventions During Call CT Head   Recommendations/ Outcomes Stabilized remains on current unit   Admission Source ED   Vitals   Temp 98.2 °F (36.8 °C)   Temp src Oral   Resp 16   SpO2 100 %   /83   MAP (mmHg) 113     Patient with history of confusion, AMS, meningioma s/p resection, seizures was unable to answer orientation questions this am and is more confused. She was admitted on 2/5 for AMS and elevated troponin, cardiac cath completed with no need for intervention. She is currently on heparin gtt. Groin site WNL, most recent labs unremarkable. Dr. Lee ordered stat CTOH. Neuro exam with non-focal deficits. NIHSS 5 (unable to answer orientation questions, name objects, BLE drift). Patient was able to get OOB and ambulate to cart for scan without difficulty. Speech is clear, she repeats \"I want to go home\" and is very tearful, does not redirect easily.     Upon chart review patient has had several CTOH in the last 12 months (most recent in 12/16) and a recent MRI in Sept. 2016 which found: No evidence for acute ischemia or intracranial hemorrhage. Stable encephalomalacia of the left parietal lobe with adjacent meningioma and skull defect also unchanged.    Will watch for CTOH results. Please re-page stat team if needed.    Statement Selected

## 2020-01-17 NOTE — OB PROVIDER H&P - NSHPPHYSICALEXAM_GEN_ALL_CORE
Gen: wdwn female, A&Ox3, NAD,   Chest: s1s2 + RRR, no w/r/r  abd: gravid,  ext: no edema noted b/l lower extremities    5'5"  187lbs/85 kgs  BMI : 31.1

## 2020-01-17 NOTE — DISCHARGE NOTE OB - CARE PROVIDER_API CALL
Denice Paige)  Obstetrics and Gynecology  [address] [place] [address]  Phone: [phone]  Fax: [phone]  Follow Up Time:

## 2020-01-17 NOTE — OB PROVIDER H&P - ASSESSMENT
35 yo Female  at 40w1d presents for scheduled  section    Admit to L&D  Routine labs/nst/ivf @200cc/hr  Pepcid, Reglan, Bicitra  prenatal record summary available  prenatal work sheet reviewed  pt willing to accept blood if needed, consents obtained  Dr. Paige informed  Anesthesia consult for pain management    [facility], PAC  #[id number]

## 2020-01-17 NOTE — OB PROVIDER DELIVERY SUMMARY - NSPROVIDERDELIVERYNOTE_OBGYN_ALL_OB_FT
pt low risk for DVT, hemorrhage, sepsis  no pp antibiotics    EBL 1200cc  QBL 641cc  UO 500cc  LR 2000cc

## 2020-01-17 NOTE — OB RN DELIVERY SUMMARY - NS_SEPSISRSKCALC_OBGYN_ALL_OB_FT
EOS calculated successfully. EOS Risk Factor: 0.5/1000 live births ([facility] national incidence); GA=40w1d; Temp=97.9; ROM=0.017; GBS='Negative'; Antibiotics='No antibiotics or any antibiotics < 2 hrs prior to birth'

## 2020-01-17 NOTE — OB RN PATIENT PROFILE - PSH
Readings from Last 3 Encounters:   07/12/19 233 lb (105.7 kg)   07/11/18 230 lb (104.3 kg)   04/10/17 225 lb (102.1 kg)     BP Readings from Last 3 Encounters:   07/12/19 108/75   07/11/18 120/82   08/27/17 (!) 146/95       Objective:   Physical Exam   Constitutional: He is oriented to person, place, and time. He appears well-developed and well-nourished. HENT:   Head: Normocephalic. Eyes: Conjunctivae are normal.   Neck: No thyromegaly present. Cardiovascular: Normal rate, regular rhythm and normal heart sounds. Pulmonary/Chest: Effort normal and breath sounds normal.   Musculoskeletal: He exhibits tenderness (right great toe at mtp joint painful  able to flex and ext but tender erythema ). Lymphadenopathy:     He has no cervical adenopathy. Neurological: He is alert and oriented to person, place, and time. Skin: Skin is warm and dry. No rash noted. Psychiatric: He has a normal mood and affect. His behavior is normal. Judgment and thought content normal.   Nursing note and vitals reviewed. Assessment:      Assessment/plan;  Luz Saint was seen today for medication refill. Diagnoses and all orders for this visit:    Gastroesophageal reflux disease, esophagitis presence not specified  -     [facility] - Ashley Sierra MD, Gastroenterology, [facility]    Injury of right foot, initial encounter  -     Cancel: XR FOOT RIGHT (2 VIEWS); Future    Anxiety  -     DULoxetine (CYMBALTA) 30 MG extended release capsule; Take 1 capsule by mouth daily For two weeks then increasing to 2 daily      No follow-ups on file.     Lupe Villalpando DO
H/O dilation and curettage    H/O:  section  2007

## 2020-01-17 NOTE — DISCHARGE NOTE OB - MEDICATION SUMMARY - MEDICATIONS TO TAKE
I will START or STAY ON the medications listed below when I get home from the hospital:    Prenatal Multivitamins oral tablet  -- 1 tab(s) by mouth once a day  -- Indication: For  delivery delivered I will START or STAY ON the medications listed below when I get home from the hospital:    ibuprofen 600 mg oral tablet  -- 1 tab(s) by mouth every 6 hours  -- Indication: For  delivery delivered    acetaminophen 325 mg oral tablet  -- 3 tab(s) by mouth every 6 hours  -- Indication: For  delivery delivered    Prenatal Multivitamins oral tablet  -- 1 tab(s) by mouth once a day  -- Indication: For  delivery delivered

## 2020-01-17 NOTE — DISCHARGE NOTE OB - PATIENT PORTAL LINK FT
You can access the FollowMyHealth Patient Portal offered by [facility] by registering at the following website: [email or web address]. By joining [employer]’s FollowMyHealth portal, you will also be able to view your health information using other applications (apps) compatible with our system.

## 2020-01-17 NOTE — OB PROVIDER H&P - NSHPLABSRESULTS_GEN_ALL_CORE
Type + Screen (01.17.20 @ 06:17)    ABO Interpretation: B    Rh Interpretation: Positive    Antibody Screen: Negative    Complete Blood Count STAT (01.17.20 @ 05:35)    WBC Count: 8.05 K/uL    Hemoglobin: 12.4 g/dL    Hematocrit: 38.8 %    Platelet Count - Automated: 203 K/uL

## 2020-01-17 NOTE — OB PROVIDER H&P - HISTORY OF PRESENT ILLNESS
35 yo female  at 40w1d presents to L&D for scheduled repeat  section.  Pt denies any vaginal bleeding or LOF, pt states +FM.  Pt is a chronic Hepatitis B carrier 2/2 blood transfusion at age 9 for SS/anemia/trait.

## 2020-01-17 NOTE — OB PROVIDER H&P - ATTENDING COMMENTS
ob attending    pt for repeat  section at term  I discussed with patient risks include but not limited to infection, bleeding, injury to other organs. pt signed consent and will proceed for repeat elective  section  fht cat 1  chronic Hep B  social work consult ANNALEE Snell MD

## 2020-01-18 LAB
BASOPHILS # BLD AUTO: 0.01 K/UL — SIGNIFICANT CHANGE UP (ref 0–0.2)
BASOPHILS NFR BLD AUTO: 0.1 % — SIGNIFICANT CHANGE UP (ref 0–2)
EOSINOPHIL # BLD AUTO: 0.03 K/UL — SIGNIFICANT CHANGE UP (ref 0–0.5)
EOSINOPHIL NFR BLD AUTO: 0.2 % — SIGNIFICANT CHANGE UP (ref 0–6)
HCT VFR BLD CALC: 35.2 % — SIGNIFICANT CHANGE UP (ref 34.5–45)
HGB BLD-MCNC: 11.5 G/DL — SIGNIFICANT CHANGE UP (ref 11.5–15.5)
IMM GRANULOCYTES NFR BLD AUTO: 0.8 % — SIGNIFICANT CHANGE UP (ref 0–1.5)
LYMPHOCYTES # BLD AUTO: 1.21 K/UL — SIGNIFICANT CHANGE UP (ref 1–3.3)
LYMPHOCYTES # BLD AUTO: 10 % — LOW (ref 13–44)
MCHC RBC-ENTMCNC: 26.2 PG — LOW (ref 27–34)
MCHC RBC-ENTMCNC: 32.7 % — SIGNIFICANT CHANGE UP (ref 32–36)
MCV RBC AUTO: 80.2 FL — SIGNIFICANT CHANGE UP (ref 80–100)
MONOCYTES # BLD AUTO: 2.02 K/UL — HIGH (ref 0–0.9)
MONOCYTES NFR BLD AUTO: 16.7 % — HIGH (ref 2–14)
NEUTROPHILS # BLD AUTO: 8.74 K/UL — HIGH (ref 1.8–7.4)
NEUTROPHILS NFR BLD AUTO: 72.2 % — SIGNIFICANT CHANGE UP (ref 43–77)
NRBC # FLD: 0 K/UL — SIGNIFICANT CHANGE UP (ref 0–0)
PLATELET # BLD AUTO: 178 K/UL — SIGNIFICANT CHANGE UP (ref 150–400)
PMV BLD: 9.5 FL — SIGNIFICANT CHANGE UP (ref 7–13)
RBC # BLD: 4.39 M/UL — SIGNIFICANT CHANGE UP (ref 3.8–5.2)
RBC # FLD: 15.9 % — HIGH (ref 10.3–14.5)
WBC # BLD: 12.11 K/UL — HIGH (ref 3.8–10.5)
WBC # FLD AUTO: 12.11 K/UL — HIGH (ref 3.8–10.5)

## 2020-01-18 RX ORDER — FERROUS SULFATE 325(65) MG
325 TABLET ORAL DAILY
Refills: 0 | Status: DISCONTINUED | OUTPATIENT
Start: 2020-01-18 | End: 2020-01-20

## 2020-01-18 RX ORDER — IBUPROFEN 200 MG
600 TABLET ORAL THREE TIMES A DAY
Refills: 0 | Status: DISCONTINUED | OUTPATIENT
Start: 2020-01-18 | End: 2020-01-19

## 2020-01-18 RX ADMIN — Medication 600 MILLIGRAM(S): at 16:10

## 2020-01-18 RX ADMIN — HEPARIN SODIUM 5000 UNIT(S): 5000 INJECTION INTRAVENOUS; SUBCUTANEOUS at 15:18

## 2020-01-18 RX ADMIN — Medication 600 MILLIGRAM(S): at 09:12

## 2020-01-18 RX ADMIN — Medication 600 MILLIGRAM(S): at 22:09

## 2020-01-18 RX ADMIN — Medication 975 MILLIGRAM(S): at 17:52

## 2020-01-18 RX ADMIN — Medication 30 MILLIGRAM(S): at 03:46

## 2020-01-18 RX ADMIN — Medication 600 MILLIGRAM(S): at 10:15

## 2020-01-18 RX ADMIN — Medication 975 MILLIGRAM(S): at 13:00

## 2020-01-18 RX ADMIN — Medication 975 MILLIGRAM(S): at 06:55

## 2020-01-18 RX ADMIN — Medication 975 MILLIGRAM(S): at 06:18

## 2020-01-18 RX ADMIN — Medication 600 MILLIGRAM(S): at 15:18

## 2020-01-18 RX ADMIN — Medication 975 MILLIGRAM(S): at 12:00

## 2020-01-18 RX ADMIN — Medication 600 MILLIGRAM(S): at 21:24

## 2020-01-18 RX ADMIN — HEPARIN SODIUM 5000 UNIT(S): 5000 INJECTION INTRAVENOUS; SUBCUTANEOUS at 03:46

## 2020-01-18 RX ADMIN — MAGNESIUM HYDROXIDE 30 MILLILITER(S): 400 TABLET, CHEWABLE ORAL at 17:52

## 2020-01-18 RX ADMIN — Medication 30 MILLIGRAM(S): at 04:15

## 2020-01-18 RX ADMIN — Medication 975 MILLIGRAM(S): at 18:50

## 2020-01-18 NOTE — PROGRESS NOTE ADULT - SUBJECTIVE AND OBJECTIVE BOX
OB Progress Note:  Delivery, POD#1    S: 35yo POD#1 s/p LTCS . Her pain is well controlled. She is tolerating a regular diet and passing flatus. Denies N/V. Denies CP/SOB/lightheadedness/dizziness.   She is ambulating without difficulty.   Voiding spontaneously.     O:   Vital Signs Last 24 Hrs  T(C): 36.7 (2020 01:54), Max: 36.9 (2020 15:45)  T(F): 98.1 (2020 01:54), Max: 98.4 (2020 15:45)  HR: 90 (2020 01:54) (77 - 104)  BP: 115/58 (2020 01:54) (98/51 - 119/62)  BP(mean): 59 (2020 14:30) (55 - 81)  RR: 18 (2020 01:54) (11 - 21)  SpO2: 99% (2020 01:54) (93% - 100%)    Labs:  Blood type: B Positive  Rubella IgG: RPR: Negative                          12.2   -- >-----------< --    (  @ 14:35 )             38.9                        12.2   6.14 >-----------< 172    (  @ 10:25 )             38.0                        12.4   8.05 >-----------< 203    (  @ 05:35 )             38.8                  PE:  General: NAD  Abdomen: Mildly distended, appropriately tender, incision c/d/i.  Extremities: No erythema, no pitting edema

## 2020-01-18 NOTE — PROGRESS NOTE ADULT - PROBLEM SELECTOR PLAN 1
- Continue regular diet.  - Increase ambulation.  - Continue motrin, tylenol, oxycodone PRN for pain control.   - F/u AM ENRIQUE Bishop PGY1

## 2020-01-18 NOTE — PROGRESS NOTE ADULT - SUBJECTIVE AND OBJECTIVE BOX
ANESTHESIA POSTOP CHECK    34y Female POSTOP DAY 1     Vital Signs Last 24 Hrs  T(C): 37 (18 Jan 2020 06:33), Max: 37 (18 Jan 2020 06:33)  T(F): 98.6 (18 Jan 2020 06:33), Max: 98.6 (18 Jan 2020 06:33)  HR: 92 (18 Jan 2020 06:33) (80 - 104)  BP: 116/64 (18 Jan 2020 06:33) (98/51 - 116/64)  BP(mean): 59 (17 Jan 2020 14:30) (59 - 81)  RR: 17 (18 Jan 2020 06:33) (15 - 21)  SpO2: 98% (18 Jan 2020 06:33) (93% - 100%)  I&O's Summary    17 Jan 2020 07:01  -  18 Jan 2020 07:00  --------------------------------------------------------  IN: 1266 mL / OUT: 3350 mL / NET: -2084 mL        NO APPARENT ANESTHESIA COMPLICATIONS

## 2020-01-18 NOTE — PROGRESS NOTE ADULT - SUBJECTIVE AND OBJECTIVE BOX
Postop Day  __1_ s/p   C- Section    THERAPY:  [ x ] Spinal morphine   [  ] Epidural morphine   [  ] IV PCA Hydromorphone 1 mg/ml    T(C): 37 (01-18-20 @ 06:33), Max: 37 (01-18-20 @ 06:33)  HR: 92 (01-18-20 @ 06:33) (92 - 92)  BP: 116/64 (01-18-20 @ 06:33) (116/64 - 116/64)  RR: 17 (01-18-20 @ 06:33) (17 - 17)  SpO2: 98% (01-18-20 @ 06:33) (98% - 98%)    MEDICATIONS  (STANDING):  acetaminophen   Tablet .. 975 milliGRAM(s) Oral every 6 hours  diphtheria/tetanus/pertussis (acellular) Vaccine (ADAcel) 0.5 milliLiter(s) IntraMuscular once  ferrous    sulfate 325 milliGRAM(s) Oral daily  heparin  Injectable 5000 Unit(s) SubCutaneous every 12 hours  ibuprofen  Tablet. 600 milliGRAM(s) Oral three times a day  ketorolac   Injectable 30 milliGRAM(s) IV Push every 6 hours  prenatal multivitamin 1 Tablet(s) Oral daily    MEDICATIONS  (PRN):  butorphanol Injectable 0.125 milliGRAM(s) IV Push every 6 hours PRN Pruritus  diphenhydrAMINE 25 milliGRAM(s) Oral every 6 hours PRN Itching  glycerin Suppository - Adult 1 Suppository(s) Rectal at bedtime PRN Constipation  lanolin Ointment 1 Application(s) Topical every 6 hours PRN Sore Nipples  magnesium hydroxide Suspension 30 milliLiter(s) Oral two times a day PRN Constipation  naloxone Injectable 0.1 milliGRAM(s) IV Push every 3 minutes PRN For ANY of the following changes in patient status:  A. RR LESS THAN 10 breaths per minute, B. Oxygen saturation LESS THAN 90%, C. Sedation score of 6  ondansetron Injectable 4 milliGRAM(s) IV Push every 6 hours PRN Nausea  oxyCODONE    IR 5 milliGRAM(s) Oral every 3 hours PRN Mild Pain (1 - 3)  oxyCODONE    IR 10 milliGRAM(s) Oral every 3 hours PRN Moderate Pain (4 - 6)  oxyCODONE    IR 5 milliGRAM(s) Oral every 3 hours PRN Moderate Pain (4 - 6)  simethicone 80 milliGRAM(s) Chew every 4 hours PRN Gas      Pain:   ______mild_____ at rest;  ______moderate_____with activity    Sedation Score:	  [ x ] Alert	    [  ] Drowsy        [  ] Arousable	[  ] Asleep	[  ] Unresponsive    Side Effects:	  [  ] None	     [  ] Nausea        [ x ] Pruritus        [  ] Weakness   [  ] Numbness        ASSESSMENT/ PLAN  [ x  ] Side effects resolving      [  x ] Patient made aware of PRN meds available     [ x] Discontinue & switch to PRN pain medications        [  ] Continue       Patient states lower extremities feel and move normally. No apparent anesthetic complications.

## 2020-01-18 NOTE — PROGRESS NOTE ADULT - SUBJECTIVE AND OBJECTIVE BOX
Postpartum Note,  Section  She is a  34y woman who is now post-operative day: 1    Subjective:  The patient feels well.  She is ambulating.   She is tolerating regular diet.  She denies nausea and vomiting.  She is voiding.  Her pain is controlled.  She reports normal postpartum bleeding.        Physical exam:    Vital Signs Last 24 Hrs  T(C): 36.7 (2020 01:54), Max: 36.9 (2020 15:45)  T(F): 98.1 (2020 01:54), Max: 98.4 (2020 15:45)  HR: 90 (2020 01:54) (77 - 104)  BP: 115/58 (2020 01:54) (98/51 - 119/62)  BP(mean): 59 (2020 14:30) (55 - 81)  RR: 18 (2020 01:54) (11 - 21)  SpO2: 99% (2020 01:54) (93% - 100%)    Gen: NAD  Breast: Soft, nontender, not engorged.  Abdomen: Soft, nontender, no distension , firm uterine fundus at umbilicus.  Incision: Clean, dry, and intact with steri strips  Pelvic: Normal lochia noted  Ext: No calf tenderness    LABS:                        12.2   x     )-----------( x        ( 2020 14:35 )             38.9                         12.2   6.14  )-----------( 172      ( 2020 10:25 )             38.0                         12.4   8.05  )-----------( 203      ( 2020 05:35 )             38.8     ABO Interpretation: B ( @ 06:17)  Rh Interpretation: Positive ( @ 06:17)  Antibody Screen: Negative ( @ 06:17)                Allergies    No Known Allergies    Intolerances      MEDICATIONS  (STANDING):  acetaminophen   Tablet .. 975 milliGRAM(s) Oral every 6 hours  diphtheria/tetanus/pertussis (acellular) Vaccine (ADAcel) 0.5 milliLiter(s) IntraMuscular once  ferrous    sulfate 325 milliGRAM(s) Oral daily  heparin  Injectable 5000 Unit(s) SubCutaneous every 12 hours  ketorolac   Injectable 30 milliGRAM(s) IV Push every 6 hours  prenatal multivitamin 1 Tablet(s) Oral daily    MEDICATIONS  (PRN):  butorphanol Injectable 0.125 milliGRAM(s) IV Push every 6 hours PRN Pruritus  diphenhydrAMINE 25 milliGRAM(s) Oral every 6 hours PRN Itching  glycerin Suppository - Adult 1 Suppository(s) Rectal at bedtime PRN Constipation  lanolin Ointment 1 Application(s) Topical every 6 hours PRN Sore Nipples  magnesium hydroxide Suspension 30 milliLiter(s) Oral two times a day PRN Constipation  naloxone Injectable 0.1 milliGRAM(s) IV Push every 3 minutes PRN For ANY of the following changes in patient status:  A. RR LESS THAN 10 breaths per minute, B. Oxygen saturation LESS THAN 90%, C. Sedation score of 6  ondansetron Injectable 4 milliGRAM(s) IV Push every 6 hours PRN Nausea  oxyCODONE    IR 5 milliGRAM(s) Oral every 3 hours PRN Mild Pain (1 - 3)  oxyCODONE    IR 10 milliGRAM(s) Oral every 3 hours PRN Moderate Pain (4 - 6)  oxyCODONE    IR 5 milliGRAM(s) Oral every 3 hours PRN Moderate Pain (4 - 6)  simethicone 80 milliGRAM(s) Chew every 4 hours PRN Gas        Assessment and Plan  POD #1 s/p  section  Doing well.  Encourage ambulation.

## 2020-01-19 RX ORDER — IBUPROFEN 200 MG
600 TABLET ORAL EVERY 6 HOURS
Refills: 0 | Status: DISCONTINUED | OUTPATIENT
Start: 2020-01-19 | End: 2020-01-20

## 2020-01-19 RX ADMIN — Medication 600 MILLIGRAM(S): at 17:58

## 2020-01-19 RX ADMIN — MAGNESIUM HYDROXIDE 30 MILLILITER(S): 400 TABLET, CHEWABLE ORAL at 10:26

## 2020-01-19 RX ADMIN — Medication 975 MILLIGRAM(S): at 06:45

## 2020-01-19 RX ADMIN — HEPARIN SODIUM 5000 UNIT(S): 5000 INJECTION INTRAVENOUS; SUBCUTANEOUS at 17:59

## 2020-01-19 RX ADMIN — OXYCODONE HYDROCHLORIDE 5 MILLIGRAM(S): 5 TABLET ORAL at 15:11

## 2020-01-19 RX ADMIN — Medication 975 MILLIGRAM(S): at 11:14

## 2020-01-19 RX ADMIN — Medication 600 MILLIGRAM(S): at 11:14

## 2020-01-19 RX ADMIN — Medication 975 MILLIGRAM(S): at 00:10

## 2020-01-19 RX ADMIN — OXYCODONE HYDROCHLORIDE 5 MILLIGRAM(S): 5 TABLET ORAL at 11:04

## 2020-01-19 RX ADMIN — Medication 975 MILLIGRAM(S): at 00:43

## 2020-01-19 RX ADMIN — OXYCODONE HYDROCHLORIDE 5 MILLIGRAM(S): 5 TABLET ORAL at 16:00

## 2020-01-19 RX ADMIN — Medication 975 MILLIGRAM(S): at 05:55

## 2020-01-19 RX ADMIN — Medication 600 MILLIGRAM(S): at 05:00

## 2020-01-19 RX ADMIN — Medication 975 MILLIGRAM(S): at 23:56

## 2020-01-19 RX ADMIN — Medication 975 MILLIGRAM(S): at 18:30

## 2020-01-19 RX ADMIN — Medication 600 MILLIGRAM(S): at 23:56

## 2020-01-19 RX ADMIN — HEPARIN SODIUM 5000 UNIT(S): 5000 INJECTION INTRAVENOUS; SUBCUTANEOUS at 05:54

## 2020-01-19 RX ADMIN — Medication 600 MILLIGRAM(S): at 04:09

## 2020-01-19 RX ADMIN — SIMETHICONE 80 MILLIGRAM(S): 80 TABLET, CHEWABLE ORAL at 00:07

## 2020-01-19 RX ADMIN — Medication 600 MILLIGRAM(S): at 18:31

## 2020-01-19 RX ADMIN — Medication 975 MILLIGRAM(S): at 11:54

## 2020-01-19 RX ADMIN — OXYCODONE HYDROCHLORIDE 5 MILLIGRAM(S): 5 TABLET ORAL at 10:25

## 2020-01-19 RX ADMIN — Medication 600 MILLIGRAM(S): at 11:54

## 2020-01-19 RX ADMIN — Medication 975 MILLIGRAM(S): at 17:58

## 2020-01-19 NOTE — PROGRESS NOTE ADULT - SUBJECTIVE AND OBJECTIVE BOX
Patient assessed at 0910.  Subjective  Pain: Patient denies any pain at the time of assessment. Pain being managed well by pain management protocol.  Complaints: None. Patient denies any headache, blur vision, dizziness and/or weakness/fatigue  Milestones:  Alert and orientedx3. Out of bed ambulating. Positive flatus. Positive bowel movement. Voiding.  Tolerating a regular diet.  Infant feeding: breastfeeding  Feeding related issues and/or concerns: issues latching infant to left breast. patient reports nipple sore. Referred to lactation.     OBJECTIVE:  T(C): 36.6 (20 @ 05:17), Max: 36.9 (20 @ 22:35)  HR: 82 (20 @ 05:17) (82 - 96)  BP: 111/68 (20 @ 05:17) (100/58 - 122/67)  RR: 16 (20 @ 05:17) (15 - 16)  SpO2: 97% (20 @ 05:17) (96% - 97%)  Wt(kg): --               LABS:               11.5   12.11 )-----------( 178      ( 2020 06:15 )             35.2     Blood Type: B Positive    RPR: Negative    Rubella  Immune          MEDICATIONS  (STANDING):  acetaminophen   Tablet .. 975 milliGRAM(s) Oral every 6 hours  diphtheria/tetanus/pertussis (acellular) Vaccine (ADAcel) 0.5 milliLiter(s) IntraMuscular once  ferrous    sulfate 325 milliGRAM(s) Oral daily  heparin  Injectable 5000 Unit(s) SubCutaneous every 12 hours  ibuprofen  Tablet. 600 milliGRAM(s) Oral every 6 hours  ketorolac   Injectable 30 milliGRAM(s) IV Push every 6 hours  prenatal multivitamin 1 Tablet(s) Oral daily    MEDICATIONS  (PRN):  butorphanol Injectable 0.125 milliGRAM(s) IV Push every 6 hours PRN Pruritus  diphenhydrAMINE 25 milliGRAM(s) Oral every 6 hours PRN Itching  glycerin Suppository - Adult 1 Suppository(s) Rectal at bedtime PRN Constipation  lanolin Ointment 1 Application(s) Topical every 6 hours PRN Sore Nipples  magnesium hydroxide Suspension 30 milliLiter(s) Oral two times a day PRN Constipation  naloxone Injectable 0.1 milliGRAM(s) IV Push every 3 minutes PRN For ANY of the following changes in patient status:  A. RR LESS THAN 10 breaths per minute, B. Oxygen saturation LESS THAN 90%, C. Sedation score of 6  ondansetron Injectable 4 milliGRAM(s) IV Push every 6 hours PRN Nausea  oxyCODONE    IR 5 milliGRAM(s) Oral every 3 hours PRN Mild Pain (1 - 3)  oxyCODONE    IR 10 milliGRAM(s) Oral every 3 hours PRN Moderate Pain (4 - 6)  oxyCODONE    IR 5 milliGRAM(s) Oral every 3 hours PRN Moderate Pain (4 - 6)  simethicone 80 milliGRAM(s) Chew every 4 hours PRN Gas        ASSESSMENT:  35y/o     Day#2    repeat post-operative  section delivery.   Condition: Stable  Past Medical/Surgical/OB/GYN History significant for: history of depression referred to social work,  section 2017, D&Cx2, Hepatitis B carrier, Sickle cell trait (with blood transfusion at 8y/o)  Current Issues: QBL 641cc, dense adhesions  Lung sounds clear bilaterally.  Breasts: soft, nontender  Nipples: intact  Abdomen: Soft, slight distended tympanic and nontender. Bowel sounds present. Fundus firm  Abdominal incision: Clean, dry and intact with steri strips.   Vaginal: Lochia light rubra  Extremities: Slight edema noted bilaterally and equal to lower extremities, nontender with no erythremic areas noted. Positive pedal pulses. No palpable veins noted  Other relevant physical exam findings: patient in good spirits bonding well with infant, family at bedside.       Plan  Continue routine post-operative and postpartum care  Increase ambulation, analgesia PRN and pain medication protocol of standing ibuprofen and acetaminophen and oxycodone prn  Encouraged to wear abdominal binder for support and to use incentive spirometer  Discussed breast/nipple care and breastfeeding.

## 2020-01-19 NOTE — LACTATION INITIAL EVALUATION - LACTATION INTERVENTIONS
assisted with deep latch and positioning  discussed  signs  of  effective  feeding and  swallowing. discussed  compression at  breast when  nbn  stops  drinking  and  is  still sucking.  instructed  to offer both  breast at a feeding ,feed on cue and safe  skin to skin. .  reviewed  prevention  and treatment  for  sore  nipples ./initiate skin to skin assisted with deep latch and positioning . discussed  signs  of  effective  feeding and  swallowing. discussed  compression at  breast when  nbn  stops  drinking  and  is  still sucking.  instructed  to offer both  breast at a feeding ,feed on cue and safe  skin to skin. .  reviewed  prevention  and treatment  for  sore  nipples ./initiate skin to skin assisted with deep latch and positioning . discussed  signs  of  effective  feeding and  swallowing. discussed  compression at  breast when  nbn  stops  drinking  and  is  still sucking.  instructed  to offer both  breast at a feeding ,feed on cue and safe  skin to skin. .  reviewed  prevention  and treatment  for  sore  nipples reviewed  cdc  recommendations  for  sore  cracked nipples and  to  express and  dump  until nipples  heal. ./initiate skin to skin

## 2020-01-19 NOTE — PROGRESS NOTE ADULT - SUBJECTIVE AND OBJECTIVE BOX
Postpartum Note,  Section  She is a  34y woman who is now post-operative day: 2    Subjective:  The patient feels well.  She is ambulating.   She is tolerating regular diet.  She denies nausea and vomiting.  She is voiding.  Her pain is controlled.  She reports normal postpartum bleeding.        Physical exam:    Vital Signs Last 24 Hrs  T(C): 36.6 (2020 05:17), Max: 36.9 (2020 22:35)  T(F): 97.8 (2020 05:17), Max: 98.4 (2020 22:35)  HR: 82 (2020 05:17) (82 - 96)  BP: 111/68 (2020 05:17) (100/58 - 122/67)  BP(mean): --  RR: 16 (2020 05:17) (15 - 16)  SpO2: 97% (2020 05:17) (96% - 97%)    Gen: NAD  Breast: Soft, nontender, not engorged.  Abdomen: Soft, nontender, no distension , firm uterine fundus at umbilicus.  Incision: Clean, dry, and intact with steri strips  Pelvic: Normal lochia noted  Ext: No calf tenderness    LABS:                        11.5   12.11 )-----------( 178      ( 2020 06:15 )             35.2                         12.2   x     )-----------( x        ( 2020 14:35 )             38.9                         12.2   6.14  )-----------( 172      ( 2020 10:25 )             38.0                   Allergies    No Known Allergies    Intolerances      MEDICATIONS  (STANDING):  acetaminophen   Tablet .. 975 milliGRAM(s) Oral every 6 hours  diphtheria/tetanus/pertussis (acellular) Vaccine (ADAcel) 0.5 milliLiter(s) IntraMuscular once  ferrous    sulfate 325 milliGRAM(s) Oral daily  heparin  Injectable 5000 Unit(s) SubCutaneous every 12 hours  ibuprofen  Tablet. 600 milliGRAM(s) Oral every 6 hours  ketorolac   Injectable 30 milliGRAM(s) IV Push every 6 hours  prenatal multivitamin 1 Tablet(s) Oral daily    MEDICATIONS  (PRN):  butorphanol Injectable 0.125 milliGRAM(s) IV Push every 6 hours PRN Pruritus  diphenhydrAMINE 25 milliGRAM(s) Oral every 6 hours PRN Itching  glycerin Suppository - Adult 1 Suppository(s) Rectal at bedtime PRN Constipation  lanolin Ointment 1 Application(s) Topical every 6 hours PRN Sore Nipples  magnesium hydroxide Suspension 30 milliLiter(s) Oral two times a day PRN Constipation  naloxone Injectable 0.1 milliGRAM(s) IV Push every 3 minutes PRN For ANY of the following changes in patient status:  A. RR LESS THAN 10 breaths per minute, B. Oxygen saturation LESS THAN 90%, C. Sedation score of 6  ondansetron Injectable 4 milliGRAM(s) IV Push every 6 hours PRN Nausea  oxyCODONE    IR 5 milliGRAM(s) Oral every 3 hours PRN Mild Pain (1 - 3)  oxyCODONE    IR 10 milliGRAM(s) Oral every 3 hours PRN Moderate Pain (4 - 6)  oxyCODONE    IR 5 milliGRAM(s) Oral every 3 hours PRN Moderate Pain (4 - 6)  simethicone 80 milliGRAM(s) Chew every 4 hours PRN Gas        Assessment and Plan  POD #2 s/p  section  Doing well.  Encourage ambulation.  social work

## 2020-01-19 NOTE — LACTATION INITIAL EVALUATION - INTERVENTION OUTCOME
nbn demonstrated  deep latch and  performed  with sucking and swallowing  noted .  reviewed  if  nipples  are  sor and  latch  is  painful  hand  express and  pump  as tolerated  .  rn a ware  of  visit./verbalizes understanding nbn demonstrated  deep latch and  performed  with sucking and swallowing  noted .  reviewed  if  nipples  are  sore and  latch  is  painful  hand  express and  pump  as tolerated  .   reviewed  and  literature  given  to  pu,mp  and  dump  milk  from  breast   that  has  a sore cracked  bleeding  nipple  and   encouraged  to  nurse  from  rt breast  free  from  cracked nipple  or  bleeding.  rn a ware  of  visit. and  to  follow ./verbalizes understanding nbn demonstrated  deep latch and  performed  with sucking and swallowing  noted .  reviewed  if  nipples  are cracked and bleeding and  latch  is  painful  hand  express and  pump  as tolerated  .   reviewed  and  literature  given from [facility] to  pump  and  dump  milk  from  breast   that  has  a sore cracked  bleeding  nipple  and   encouraged  to  nurse  from  rt breast  free  from  cracked nipple  or  bleeding.  rn a ware  of  visit. and  to  follow ./verbalizes understanding

## 2020-01-19 NOTE — LACTATION INITIAL EVALUATION - NIPPLE ASSESSMENT (LEFT)
medium/cracked medium/mother states has  bleedign  after  nursing   and  educated  in  pumping and  dumping  as  per  cdc  guidelines  until nipples  heal and  follow  up  with  peds./cracked mother states has  bleeding  after  nursing   and  educated  in  pumping and  dumping  as  per  cdc  guidelines  until nipples  heal and  follow  up  with  peds./medium/cracked

## 2020-01-20 VITALS
DIASTOLIC BLOOD PRESSURE: 65 MMHG | OXYGEN SATURATION: 98 % | HEART RATE: 90 BPM | SYSTOLIC BLOOD PRESSURE: 119 MMHG | TEMPERATURE: 98 F | RESPIRATION RATE: 17 BRPM

## 2020-01-20 RX ORDER — IBUPROFEN 200 MG
1 TABLET ORAL
Qty: 0 | Refills: 0 | DISCHARGE
Start: 2020-01-20

## 2020-01-20 RX ORDER — ACETAMINOPHEN 500 MG
3 TABLET ORAL
Qty: 0 | Refills: 0 | DISCHARGE
Start: 2020-01-20

## 2020-01-20 RX ADMIN — Medication 600 MILLIGRAM(S): at 00:40

## 2020-01-20 RX ADMIN — Medication 600 MILLIGRAM(S): at 13:30

## 2020-01-20 RX ADMIN — Medication 600 MILLIGRAM(S): at 12:59

## 2020-01-20 RX ADMIN — Medication 975 MILLIGRAM(S): at 00:40

## 2020-01-20 RX ADMIN — Medication 975 MILLIGRAM(S): at 06:14

## 2020-01-20 RX ADMIN — Medication 975 MILLIGRAM(S): at 13:30

## 2020-01-20 RX ADMIN — Medication 600 MILLIGRAM(S): at 06:14

## 2020-01-20 RX ADMIN — HEPARIN SODIUM 5000 UNIT(S): 5000 INJECTION INTRAVENOUS; SUBCUTANEOUS at 06:13

## 2020-01-20 RX ADMIN — Medication 600 MILLIGRAM(S): at 06:58

## 2020-01-20 RX ADMIN — Medication 975 MILLIGRAM(S): at 12:58

## 2020-01-20 RX ADMIN — Medication 975 MILLIGRAM(S): at 06:58

## 2020-01-20 NOTE — PROGRESS NOTE ADULT - SUBJECTIVE AND OBJECTIVE BOX
Postpartum Note,  Section  She is a  34y woman who is now post-operative day: 3    Subjective:  The patient feels well.  She is ambulating.   She is tolerating regular diet.  She denies nausea and vomiting.  She is voiding.  Her pain is controlled.  She reports normal postpartum bleeding    Physical exam:    Vital Signs Last 24 Hrs  T(C): 36.7 (2020 06:15), Max: 36.9 (2020 22:21)  T(F): 98.1 (2020 06:15), Max: 98.4 (2020 22:21)  HR: 90 (2020 06:15) (88 - 99)  BP: 119/65 (2020 06:15) (117/67 - 119/68)  BP(mean): --  RR: 17 (2020 06:15) (16 - 17)  SpO2: 98% (2020 06:15) (98% - 99%)    Gen: NAD  Breast: Soft, nontender, not engorged.  Abdomen: Soft, nontender, no distension , firm uterine fundus at umbilicus.  Incision: Clean, dry, and intact with steri strips  Pelvic: Normal lochia noted  Ext: No calf tenderness    LABS:                  Allergies    No Known Allergies    Intolerances      MEDICATIONS  (STANDING):  acetaminophen   Tablet .. 975 milliGRAM(s) Oral every 6 hours  diphtheria/tetanus/pertussis (acellular) Vaccine (ADAcel) 0.5 milliLiter(s) IntraMuscular once  ferrous    sulfate 325 milliGRAM(s) Oral daily  heparin  Injectable 5000 Unit(s) SubCutaneous every 12 hours  ibuprofen  Tablet. 600 milliGRAM(s) Oral every 6 hours  prenatal multivitamin 1 Tablet(s) Oral daily    MEDICATIONS  (PRN):  diphenhydrAMINE 25 milliGRAM(s) Oral every 6 hours PRN Itching  glycerin Suppository - Adult 1 Suppository(s) Rectal at bedtime PRN Constipation  lanolin Ointment 1 Application(s) Topical every 6 hours PRN Sore Nipples  magnesium hydroxide Suspension 30 milliLiter(s) Oral two times a day PRN Constipation  oxyCODONE    IR 5 milliGRAM(s) Oral every 3 hours PRN Moderate Pain (4 - 6)  simethicone 80 milliGRAM(s) Chew every 4 hours PRN Gas        Assessment and Plan  POD #3  s/p  section  Doing well.  Encourage ambulation.  D/C home with instructions

## 2020-01-20 NOTE — PROGRESS NOTE ADULT - SUBJECTIVE AND OBJECTIVE BOX
Patient assessed at 0910.  Subjective  Pain: Patient denies any pain at the time of assessment. Pain being managed well by pain management protocol.  Complaints: None. Patient denies any headache, blur vision, dizziness and/or weakness/fatigue. Patient denies any feeling of depression.  Milestones:  Alert and orientedx3. Out of bed ambulating. Positive flatus. Positive bowel movement. Voiding.  Tolerating a regular diet.  Infant feeding: breastfeeding, formula feeding and pumping breast and feeding via bottle  Feeding related issues and/or concerns:     Vital Signs Last 24 Hrs  T(C): 36.7 (2020 06:15), Max: 36.9 (2020 22:21)  T(F): 98.1 (2020 06:15), Max: 98.4 (2020 22:21)  HR: 90 (2020 06:15) (88 - 99)  BP: 119/65 (2020 06:15) (117/67 - 119/68)  BP(mean): --  RR: 17 (2020 06:15) (16 - 17)  SpO2: 98% (2020 06:15) (98% - 99%)               LABS:               11.5   12.11 )-----------( 178      ( 2020 06:15 )             35.2     Blood Type: B Positive    RPR: Negative    Rubella  Immune          MEDICATIONS  (STANDING):  acetaminophen   Tablet .. 975 milliGRAM(s) Oral every 6 hours  diphtheria/tetanus/pertussis (acellular) Vaccine (ADAcel) 0.5 milliLiter(s) IntraMuscular once  ferrous    sulfate 325 milliGRAM(s) Oral daily  heparin  Injectable 5000 Unit(s) SubCutaneous every 12 hours  ibuprofen  Tablet. 600 milliGRAM(s) Oral every 6 hours  prenatal multivitamin 1 Tablet(s) Oral daily    MEDICATIONS  (PRN):  diphenhydrAMINE 25 milliGRAM(s) Oral every 6 hours PRN Itching  glycerin Suppository - Adult 1 Suppository(s) Rectal at bedtime PRN Constipation  lanolin Ointment 1 Application(s) Topical every 6 hours PRN Sore Nipples  magnesium hydroxide Suspension 30 milliLiter(s) Oral two times a day PRN Constipation  oxyCODONE    IR 5 milliGRAM(s) Oral every 3 hours PRN Moderate Pain (4 - 6)  simethicone 80 milliGRAM(s) Chew every 4 hours PRN Gas          ASSESSMENT:  35y/o     Day#3    repeat post-operative  section delivery.   Condition: Stable  Past Medical/Surgical/OB/GYN History significant for: history of depression referred to social work,  section 2017, D&Cx2, Hepatitis B carrier, Sickle cell trait (with blood transfusion at 10y/o)  Current Issues: QBL 641cc, dense adhesions  Lung sounds clear bilaterally.  Breasts: engorged, nontender  Nipples: intact  Abdomen: Soft, slight distended tympanic and nontender. Bowel sounds present. Fundus firm  Abdominal incision: Clean, dry and intact with steri strips.   Vaginal: Lochia light rubra  Extremities: Slight edema noted bilaterally and equal to lower extremities, nontender with no erythremic areas noted. Positive pedal pulses. No palpable veins noted  Other relevant physical exam findings: patient in good spirits bonding well with infant, family at bedside.       Plan  Discussed constipation/gas relieving measures.   Continue routine post-operative and postpartum care  Increase ambulation, analgesia PRN and pain medication protocol of standing ibuprofen and acetaminophen and oxycodone prn  Encouraged to wear abdominal binder for support and to use incentive spirometer  Discussed breast/nipple/engorgement care, using breast pump and breastfeeding.   Discharge to home today. Discussed discharge planning.

## 2020-02-13 PROBLEM — B18.1 CHRONIC VIRAL HEPATITIS B WITHOUT DELTA-AGENT: Chronic | Status: ACTIVE | Noted: 2020-01-17

## 2020-03-25 ENCOUNTER — APPOINTMENT (OUTPATIENT)
Dept: DERMATOLOGY | Facility: CLINIC | Age: 35
End: 2020-03-25
Payer: MEDICAID

## 2020-03-25 DIAGNOSIS — Z79.899 OTHER LONG TERM (CURRENT) DRUG THERAPY: ICD-10-CM

## 2020-03-25 DIAGNOSIS — L70.0 ACNE VULGARIS: ICD-10-CM

## 2020-03-25 PROCEDURE — 99203 OFFICE O/P NEW LOW 30 MIN: CPT | Mod: 95

## 2020-03-25 RX ORDER — TRETINOIN 0.25 MG/G
0.03 CREAM TOPICAL
Qty: 1 | Refills: 2 | Status: ACTIVE | COMMUNITY
Start: 2020-03-25 | End: 1900-01-01

## 2020-06-16 ENCOUNTER — APPOINTMENT (OUTPATIENT)
Dept: HEPATOLOGY | Facility: CLINIC | Age: 35
End: 2020-06-16
Payer: MEDICAID

## 2020-06-16 ENCOUNTER — TRANSCRIPTION ENCOUNTER (OUTPATIENT)
Age: 35
End: 2020-06-16

## 2020-06-16 ENCOUNTER — APPOINTMENT (OUTPATIENT)
Dept: HEPATOLOGY | Facility: CLINIC | Age: 35
End: 2020-06-16

## 2020-06-16 DIAGNOSIS — B18.1 CHRONIC VIRAL HEPATITIS B W/OUT DELTA-AGENT: ICD-10-CM

## 2020-06-16 PROCEDURE — 99214 OFFICE O/P EST MOD 30 MIN: CPT | Mod: 95

## 2020-08-26 ENCOUNTER — RESULT REVIEW (OUTPATIENT)
Age: 35
End: 2020-08-26

## 2020-11-02 ENCOUNTER — APPOINTMENT (OUTPATIENT)
Dept: HEPATOLOGY | Facility: CLINIC | Age: 35
End: 2020-11-02

## 2022-07-12 ENCOUNTER — RESULT REVIEW (OUTPATIENT)
Age: 37
End: 2022-07-12

## 2022-08-01 ENCOUNTER — APPOINTMENT (OUTPATIENT)
Dept: MATERNAL FETAL MEDICINE | Facility: CLINIC | Age: 37
End: 2022-08-01

## 2022-08-01 ENCOUNTER — ASOB RESULT (OUTPATIENT)
Age: 37
End: 2022-08-01

## 2022-08-02 ENCOUNTER — NON-APPOINTMENT (OUTPATIENT)
Age: 37
End: 2022-08-02

## 2022-08-05 ENCOUNTER — APPOINTMENT (OUTPATIENT)
Dept: ANTEPARTUM | Facility: CLINIC | Age: 37
End: 2022-08-05

## 2022-08-05 ENCOUNTER — LABORATORY RESULT (OUTPATIENT)
Age: 37
End: 2022-08-05

## 2022-08-05 ENCOUNTER — ASOB RESULT (OUTPATIENT)
Age: 37
End: 2022-08-05

## 2022-08-05 PROCEDURE — 76811 OB US DETAILED SNGL FETUS: CPT

## 2022-10-13 ENCOUNTER — OUTPATIENT (OUTPATIENT)
Dept: OUTPATIENT SERVICES | Facility: HOSPITAL | Age: 37
LOS: 1 days | Discharge: HOME | End: 2022-10-13

## 2022-10-13 VITALS
DIASTOLIC BLOOD PRESSURE: 69 MMHG | TEMPERATURE: 98 F | RESPIRATION RATE: 17 BRPM | SYSTOLIC BLOOD PRESSURE: 128 MMHG | HEART RATE: 112 BPM

## 2022-10-13 VITALS — SYSTOLIC BLOOD PRESSURE: 112 MMHG | DIASTOLIC BLOOD PRESSURE: 59 MMHG | HEART RATE: 93 BPM

## 2022-10-13 DIAGNOSIS — Z98.891 HISTORY OF UTERINE SCAR FROM PREVIOUS SURGERY: Chronic | ICD-10-CM

## 2022-10-13 DIAGNOSIS — Z3A.00 WEEKS OF GESTATION OF PREGNANCY NOT SPECIFIED: ICD-10-CM

## 2022-10-13 DIAGNOSIS — Z98.890 OTHER SPECIFIED POSTPROCEDURAL STATES: Chronic | ICD-10-CM

## 2022-10-13 DIAGNOSIS — O26.899 OTHER SPECIFIED PREGNANCY RELATED CONDITIONS, UNSPECIFIED TRIMESTER: ICD-10-CM

## 2022-10-13 LAB
APPEARANCE UR: ABNORMAL
BACTERIA # UR AUTO: ABNORMAL
BILIRUB UR-MCNC: NEGATIVE — SIGNIFICANT CHANGE UP
COLOR SPEC: YELLOW — SIGNIFICANT CHANGE UP
DIFF PNL FLD: NEGATIVE — SIGNIFICANT CHANGE UP
EPI CELLS # UR: 15 /HPF — HIGH (ref 0–5)
GLUCOSE UR QL: NEGATIVE — SIGNIFICANT CHANGE UP
HYALINE CASTS # UR AUTO: 4 /LPF — SIGNIFICANT CHANGE UP (ref 0–7)
KETONES UR-MCNC: ABNORMAL
LEUKOCYTE ESTERASE UR-ACNC: NEGATIVE — SIGNIFICANT CHANGE UP
NITRITE UR-MCNC: NEGATIVE — SIGNIFICANT CHANGE UP
PH UR: 6 — SIGNIFICANT CHANGE UP (ref 5–8)
PROT UR-MCNC: ABNORMAL
RBC CASTS # UR COMP ASSIST: 3 /HPF — SIGNIFICANT CHANGE UP (ref 0–4)
SP GR SPEC: 1.01 — SIGNIFICANT CHANGE UP (ref 1.01–1.05)
UROBILINOGEN FLD QL: SIGNIFICANT CHANGE UP
WBC UR QL: 5 /HPF — SIGNIFICANT CHANGE UP (ref 0–5)

## 2022-10-13 PROCEDURE — 76818 FETAL BIOPHYS PROFILE W/NST: CPT | Mod: 26

## 2022-10-13 PROCEDURE — 76817 TRANSVAGINAL US OBSTETRIC: CPT | Mod: 26

## 2022-10-13 PROCEDURE — 99213 OFFICE O/P EST LOW 20 MIN: CPT | Mod: 25

## 2022-10-13 RX ORDER — ACETAMINOPHEN 500 MG
1000 TABLET ORAL ONCE
Refills: 0 | Status: COMPLETED | OUTPATIENT
Start: 2022-10-13 | End: 2022-10-13

## 2022-10-13 RX ADMIN — Medication 1000 MILLIGRAM(S): at 16:26

## 2022-10-13 NOTE — OB PROVIDER TRIAGE NOTE - HISTORY OF PRESENT ILLNESS
36 y/o  at 30.4 weeks gestation c/o throbbing constant middle back pain that began at 1am, pain 7/10 on pain scale. Pt states lifting her belly up slightly relieves pain. Denies trauma, new exercises, heavy lifting, or falls but has been carrying 2 year old daughter at home. Pt took Tylenol at 1am, with minimal relief. Denies fever, chills, dysuria, hematuria, flank pain, pelvic pressure, ctx, cramping, foul smelling urine, STI's, abdominal pain, n/v/d, lof, vb. Reports + FM.    AP course c/b AMA  NKDA  Current medications:  -PNV  OBGYN history:  -pLTCS  FT NRFHT 6lbs 9oz  -rpt c/s 2020 7lbs 8oz  -1 SAB complete  -1 TOP  Medical history:  -hepatitis b carrier (h/o blood transfusion age 9)  -anxiety  Surgical history:  -c/s x2  -D&C  Denies smoking, alcohol, and illicit drug use

## 2022-10-13 NOTE — OB PROVIDER TRIAGE NOTE - NSHPLABSRESULTS_GEN_ALL_CORE
Urinalysis Basic - ( 13 Oct 2022 15:50 )    Color: Yellow / Appearance: Slightly Turbid / S.015 / pH: x  Gluc: x / Ketone: Trace  / Bili: Negative / Urobili: <2 mg/dL   Blood: x / Protein: Trace / Nitrite: Negative   Leuk Esterase: Negative / RBC: 3 /HPF / WBC 5 /HPF   Sq Epi: x / Non Sq Epi: 15 /HPF / Bacteria: Moderate

## 2022-10-13 NOTE — OB PROVIDER TRIAGE NOTE - NSHPPHYSICALEXAM_GEN_ALL_CORE
A&O x3 in no acute distress  FHT: category 1 tracing  TOCO: no contractions palpated  abdomen: gravid, soft NT  back: tenderness noted midline over spine, no CVA tenderness  SSE: cervix appears closed  TVS: 4.4-4.5cm, no funneling or dynamic changes  TAS: cephalic presentation  anterior placenta  HARINDER 9.61  BPP 8/8    Vital Signs Last 24 Hrs  T(C): 36.6 (13 Oct 2022 14:50), Max: 36.6 (13 Oct 2022 11:21)  T(F): 97.88 (13 Oct 2022 14:50), Max: 97.9 (13 Oct 2022 11:21)  HR: 101 (13 Oct 2022 14:53) (101 - 112)  BP: 101/56 (13 Oct 2022 14:53) (101/56 - 128/69)  BP(mean): --  RR: 17 (13 Oct 2022 14:50) (17 - 17)  SpO2: --

## 2022-10-13 NOTE — OB PROVIDER TRIAGE NOTE - NSICDXPASTMEDICALHX_GEN_ALL_CORE_FT
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

## 2022-10-13 NOTE — OB PROVIDER TRIAGE NOTE - NSOBPROVIDERNOTE_OBGYN_ALL_OB_FT
pt feels symptom improvement with Tylenol and heat packs  no evidence of PTL or UTI, likely musculoskeletal discomfort  NST reactive, BPP 8/  d/w MD Luna  pt to be discharged home with  OB labor instructions  daily kick counts  pt to follow up with OBGYN as scheduled

## 2022-10-13 NOTE — OB RN TRIAGE NOTE - NSICDXPASTSURGICALHX_GEN_ALL_CORE_FT
PAST SURGICAL HISTORY:  H/O dilation and curettage     H/O:  section 2007     PAST SURGICAL HISTORY:  H/O dilation and curettage     H/O:  section ,

## 2022-10-13 NOTE — OB RN TRIAGE NOTE - FALL HARM RISK - UNIVERSAL INTERVENTIONS
Bed in lowest position, wheels locked, appropriate side rails in place/Call bell, personal items and telephone in reach/Instruct patient to call for assistance before getting out of bed or chair/Non-slip footwear when patient is out of bed/[place] to call system/Physically safe environment - no spills, clutter or unnecessary equipment/Purposeful Proactive Rounding/Room/bathroom lighting operational, light cord in reach

## 2022-10-13 NOTE — OB RN TRIAGE NOTE - NS_SISCREENINGSR_GEN_ALL_ED
I have reviewed the history and physical and examined the patient. I find no relevant changes. I have reviewed with the patient and/or family members, during the preoperative office visit the risks, benefits, and alternatives to the procedure.     Belkis Chan MD Negative

## 2022-10-13 NOTE — OB RN TRIAGE NOTE - NS_OBGYNHISTORY_OBGYN_ALL_OB_FT
c/s 2007 [facility]  c/d 2020   [facility] x1 c/s 6/8/2007 [facility] f 6#9  c/s 1/17/2020 f 7#  SAB x1

## 2022-10-31 ENCOUNTER — APPOINTMENT (OUTPATIENT)
Dept: ANTEPARTUM | Facility: CLINIC | Age: 37
End: 2022-10-31

## 2022-10-31 ENCOUNTER — ASOB RESULT (OUTPATIENT)
Age: 37
End: 2022-10-31

## 2022-10-31 PROCEDURE — 76819 FETAL BIOPHYS PROFIL W/O NST: CPT | Mod: 59

## 2022-10-31 PROCEDURE — 76816 OB US FOLLOW-UP PER FETUS: CPT

## 2022-11-23 ENCOUNTER — OUTPATIENT (OUTPATIENT)
Dept: OUTPATIENT SERVICES | Facility: HOSPITAL | Age: 37
LOS: 1 days | End: 2022-11-23

## 2022-11-23 VITALS
WEIGHT: 190.92 LBS | HEART RATE: 104 BPM | RESPIRATION RATE: 16 BRPM | SYSTOLIC BLOOD PRESSURE: 122 MMHG | DIASTOLIC BLOOD PRESSURE: 66 MMHG | OXYGEN SATURATION: 97 % | TEMPERATURE: 98 F | HEIGHT: 65 IN

## 2022-11-23 DIAGNOSIS — O34.211 MATERNAL CARE FOR LOW TRANSVERSE SCAR FROM PREVIOUS CESAREAN DELIVERY: ICD-10-CM

## 2022-11-23 DIAGNOSIS — Z98.891 HISTORY OF UTERINE SCAR FROM PREVIOUS SURGERY: Chronic | ICD-10-CM

## 2022-11-23 DIAGNOSIS — Z98.890 OTHER SPECIFIED POSTPROCEDURAL STATES: Chronic | ICD-10-CM

## 2022-11-23 LAB
ALBUMIN SERPL ELPH-MCNC: 3.5 G/DL — SIGNIFICANT CHANGE UP (ref 3.3–5)
ALP SERPL-CCNC: 188 U/L — HIGH (ref 40–120)
ALT FLD-CCNC: 8 U/L — SIGNIFICANT CHANGE UP (ref 4–33)
ANION GAP SERPL CALC-SCNC: 11 MMOL/L — SIGNIFICANT CHANGE UP (ref 7–14)
APPEARANCE UR: ABNORMAL
AST SERPL-CCNC: 17 U/L — SIGNIFICANT CHANGE UP (ref 4–32)
BILIRUB SERPL-MCNC: 0.4 MG/DL — SIGNIFICANT CHANGE UP (ref 0.2–1.2)
BILIRUB UR-MCNC: NEGATIVE — SIGNIFICANT CHANGE UP
BLD GP AB SCN SERPL QL: NEGATIVE — SIGNIFICANT CHANGE UP
BUN SERPL-MCNC: 5 MG/DL — LOW (ref 7–23)
CALCIUM SERPL-MCNC: 9.2 MG/DL — SIGNIFICANT CHANGE UP (ref 8.4–10.5)
CHLORIDE SERPL-SCNC: 104 MMOL/L — SIGNIFICANT CHANGE UP (ref 98–107)
CO2 SERPL-SCNC: 20 MMOL/L — LOW (ref 22–31)
COLOR SPEC: SIGNIFICANT CHANGE UP
CREAT SERPL-MCNC: 0.73 MG/DL — SIGNIFICANT CHANGE UP (ref 0.5–1.3)
DIFF PNL FLD: NEGATIVE — SIGNIFICANT CHANGE UP
EGFR: 109 ML/MIN/1.73M2 — SIGNIFICANT CHANGE UP
GLUCOSE SERPL-MCNC: 80 MG/DL — SIGNIFICANT CHANGE UP (ref 70–99)
GLUCOSE UR QL: NEGATIVE — SIGNIFICANT CHANGE UP
HCT VFR BLD CALC: 29.1 % — LOW (ref 34.5–45)
HGB BLD-MCNC: 8.7 G/DL — LOW (ref 11.5–15.5)
KETONES UR-MCNC: NEGATIVE — SIGNIFICANT CHANGE UP
LEUKOCYTE ESTERASE UR-ACNC: ABNORMAL
MCHC RBC-ENTMCNC: 19.7 PG — LOW (ref 27–34)
MCHC RBC-ENTMCNC: 29.9 GM/DL — LOW (ref 32–36)
MCV RBC AUTO: 66 FL — LOW (ref 80–100)
NITRITE UR-MCNC: NEGATIVE — SIGNIFICANT CHANGE UP
NRBC # BLD: 1 /100 WBCS — HIGH (ref 0–0)
NRBC # FLD: 0.12 K/UL — HIGH (ref 0–0)
PH UR: 6 — SIGNIFICANT CHANGE UP (ref 5–8)
PLATELET # BLD AUTO: 336 K/UL — SIGNIFICANT CHANGE UP (ref 150–400)
POTASSIUM SERPL-MCNC: 3.7 MMOL/L — SIGNIFICANT CHANGE UP (ref 3.5–5.3)
POTASSIUM SERPL-SCNC: 3.7 MMOL/L — SIGNIFICANT CHANGE UP (ref 3.5–5.3)
PROT SERPL-MCNC: 6.9 G/DL — SIGNIFICANT CHANGE UP (ref 6–8.3)
PROT UR-MCNC: NEGATIVE — SIGNIFICANT CHANGE UP
RBC # BLD: 4.41 M/UL — SIGNIFICANT CHANGE UP (ref 3.8–5.2)
RBC # FLD: 20.6 % — HIGH (ref 10.3–14.5)
RH IG SCN BLD-IMP: POSITIVE — SIGNIFICANT CHANGE UP
SODIUM SERPL-SCNC: 135 MMOL/L — SIGNIFICANT CHANGE UP (ref 135–145)
SP GR SPEC: 1.01 — LOW (ref 1.01–1.05)
UROBILINOGEN FLD QL: SIGNIFICANT CHANGE UP
WBC # BLD: 9.82 K/UL — SIGNIFICANT CHANGE UP (ref 3.8–10.5)
WBC # FLD AUTO: 9.82 K/UL — SIGNIFICANT CHANGE UP (ref 3.8–10.5)

## 2022-11-23 RX ORDER — LORATADINE 10 MG/1
1 TABLET ORAL
Qty: 0 | Refills: 0 | DISCHARGE

## 2022-11-23 RX ORDER — ACETAMINOPHEN 500 MG
1000 TABLET ORAL
Qty: 0 | Refills: 0 | DISCHARGE

## 2022-11-23 NOTE — OB PST NOTE - HISTORY OF PRESENT ILLNESS
37 year old female presents today for presurgical evaluation for ...  Pt reports good fetal movement.  37 year old female presents today for presurgical evaluation for Repeat  Section x 2, Bilateral Tubal Ligation.   Pt reports good fetal movement.     Per worksheet, "Chronic Hep B carrier"

## 2022-11-23 NOTE — OB PST NOTE - NSICDXPASTMEDICALHX_GEN_ALL_CORE_FT
PAST MEDICAL HISTORY:  Carrier of hepatitis B 1994 age 9 blood transfusion    High risk pregnancy with low PAPPA (pregnancy-associated plasma protein A)     Miscarriage 2018    Termination of pregnancy (fetus)

## 2022-11-23 NOTE — OB PST NOTE - FALL HARM RISK - UNIVERSAL INTERVENTIONS
Bed in lowest position, wheels locked, appropriate side rails in place/Call bell, personal items and telephone in reach/Instruct patient to call for assistance before getting out of bed or chair/Non-slip footwear when patient is out of bed/[place] to call system/Physically safe environment - no spills, clutter or unnecessary equipment/Purposeful Proactive Rounding/Room/bathroom lighting operational, light cord in reach

## 2022-11-23 NOTE — OB PST NOTE - NSHPREVIEWOFSYSTEMS_GEN_ALL_CORE
General: No fever, chills, sweating, anorexia, . No polyphagia, polyurea, polydypsia.    Skin: Itchyness. No rashes or dryness. No change in size/color of moles.     Breast: No tenderness, lumps, or nipple discharge      Ophthalmologic: No diplopia, photophobia, lacrimation, blurred Vision , or eye discharge    ENMT Symptoms: No hearing difficulty, ear pain, tinnitus, or vertigo. No sinus symptoms, nasal congestion, nasal   discharge, or nasal obstruction    Respiratory and Thorax: No wheezing, dyspnea, cough, hemoptysis, or pleuritic chest pain     Cardiovascular: Some pedal edema toward end of pregnancy.  No chest pain, palpitations, dyspnea on exertion, orthopnea, paroxysmal nocturnal dyspnea, or claudication    Gastrointestinal: Occaisonal nausea/ vomiting in pregnancy. No  diarrhea, constipation, change in bowel habits, flatulence, abdominal pain, or melena    Genitourinary/ Pelvis: No hematuria, renal colic, or flank pain.  No urine discoloration, incontinence, dysuria, or urinary hesitancy. Normal urinary frequency. No nocturia, abnormal vaginal bleeding, vaginal discharge, spotting, pelvic pain, or vaginal leakage    Musculoskeletal: No arthralgia, arthritis, joint swelling, muscle weakness, neck pain, arm pain, or leg pain    Neurological: No transient paralysis, weakness, paresthesias, or seizures. No syncope, tremors, vertigo, loss of sensation, difficulty walking, loss of consciousness, hemiparesis, confusion, or facial palsy    Psychiatric: No suicidal ideation, depression, anxiety, insomnia, memory loss, paranoia, mood swings, agitation, hallucinations, or hyperactivity    Hematology: No gum bleeding, nose bleeding, or skin lumps    Lymphatic: No enlarged or tender lymph nodes. No extremity swelling    Endocrine: No heat or cold intolerance    Immunologic: No recurrent or persistent infections General: No fever, chills, sweating, anorexia, . No polyphagia, polyurea, polydypsia.    Skin: Itchyness. No rashes or dryness. No change in size/color of moles.     Breast: No tenderness, lumps, or nipple discharge      Ophthalmologic: No diplopia, photophobia, lacrimation, blurred Vision , or eye discharge    ENMT Symptoms: No hearing difficulty, ear pain, tinnitus, or vertigo. No sinus symptoms, nasal congestion, nasal   discharge, or nasal obstruction    Respiratory and Thorax: No wheezing, dyspnea, cough, hemoptysis, or pleuritic chest pain     Cardiovascular: Some pedal edema toward end of pregnancy.  No chest pain, palpitations, dyspnea on exertion, orthopnea, paroxysmal nocturnal dyspnea, or claudication    Gastrointestinal: Occasional nausea/ vomiting in pregnancy. No  diarrhea, constipation, change in bowel habits, flatulence, abdominal pain, or melena    Genitourinary/ Pelvis: No hematuria, renal colic, or flank pain.  No urine discoloration, incontinence, dysuria, or urinary hesitancy. Normal urinary frequency. No nocturia, abnormal vaginal bleeding, vaginal discharge, spotting, pelvic pain, or vaginal leakage    Musculoskeletal: No arthralgia, arthritis, joint swelling, muscle weakness, neck pain, arm pain, or leg pain    Neurological: No transient paralysis, weakness, paresthesias, or seizures. No syncope, tremors, vertigo, loss of sensation, difficulty walking, loss of consciousness, hemiparesis, confusion, or facial palsy    Psychiatric: No suicidal ideation, depression, anxiety, insomnia, memory loss, paranoia, mood swings, agitation, hallucinations, or hyperactivity    Hematology: No gum bleeding, nose bleeding, or skin lumps    Lymphatic: No enlarged or tender lymph nodes. No extremity swelling    Endocrine: No heat or cold intolerance    Immunologic: No recurrent or persistent infections

## 2022-11-23 NOTE — OB PST NOTE - PROBLEM SELECTOR PLAN 1
Pt scheduled for surgery on 12/12/22.   Pre-op instructions provided. Medication instructions per OB. Pt verbalized understanding.   Pt given detailed verbal and written instructions on chlorhexidine wash. Pt verbalized understanding with teachback.   Order placed for preop COVID PCR testing.

## 2022-11-23 NOTE — OB PST NOTE - NSHPPHYSICALEXAM_GEN_ALL_CORE
Constitutional: Well Developed, Well Groomed, Well Nourished, No Distress    Eyes: PERRL, EOMI, conjunctiva clear    Ears: Normal    Mouth & Gums: Normal, moist    Pharynx: No redness, discharge.    Tonsils: No Redness, discharge,  or swelling    Neck: Supple    Breast: exam declined    Back: Normal shape, ROM intact, strength intact,     Respiratory: Airway patent, breath sounds equal, good air movement, respiration non-labored, clear to auscultation bilateral    Cardiovascular:  Regular rate and rhythm, no murmur    Gastrointestinal: Soft, non-tender, bowel sound normal,  no rebound tenderness    Extremities: No clubbing, cyanosis, or pedal edema    Neurological: alert & oriented x 3, sensation intact,  normal strength    Skin: warm and dry, normal color    Lymph Nodes: normal posterior cervical lymph node, normal anterior cervical lymph node, normal supraclavicular lymph node    Musculoskeletal: ROM intact, no joint swelling, warmth, or calf tenderness. Normal strength    Psychiatric: normal affect, normal behavior

## 2022-12-02 ENCOUNTER — NON-APPOINTMENT (OUTPATIENT)
Age: 37
End: 2022-12-02

## 2022-12-02 ENCOUNTER — APPOINTMENT (OUTPATIENT)
Dept: MATERNAL FETAL MEDICINE | Facility: CLINIC | Age: 37
End: 2022-12-02

## 2022-12-02 ENCOUNTER — ASOB RESULT (OUTPATIENT)
Age: 37
End: 2022-12-02

## 2022-12-02 DIAGNOSIS — O24.419 GESTATIONAL DIABETES MELLITUS IN PREGNANCY, UNSPECIFIED CONTROL: ICD-10-CM

## 2022-12-02 PROCEDURE — G0109 DIAB MANAGE TRN IND/GROUP: CPT | Mod: 95

## 2022-12-02 RX ORDER — URINE ACETONE TEST STRIPS
STRIP MISCELLANEOUS
Qty: 1 | Refills: 0 | Status: ACTIVE | COMMUNITY
Start: 2022-12-02 | End: 1900-01-01

## 2022-12-02 RX ORDER — CHOLECALCIFEROL (VITAMIN D3) 10(400)/ML
DROPS ORAL
Qty: 1 | Refills: 0 | Status: ACTIVE | COMMUNITY
Start: 2022-12-02 | End: 1900-01-01

## 2022-12-02 RX ORDER — BLOOD-GLUCOSE METER
KIT MISCELLANEOUS
Qty: 1 | Refills: 0 | Status: ACTIVE | COMMUNITY
Start: 2022-12-02 | End: 1900-01-01

## 2022-12-02 RX ORDER — BLOOD-GLUCOSE METER
W/DEVICE KIT MISCELLANEOUS
Qty: 1 | Refills: 0 | Status: ACTIVE | COMMUNITY
Start: 2022-12-02 | End: 1900-01-01

## 2022-12-06 ENCOUNTER — APPOINTMENT (OUTPATIENT)
Dept: ANTEPARTUM | Facility: CLINIC | Age: 37
End: 2022-12-06

## 2022-12-06 ENCOUNTER — ASOB RESULT (OUTPATIENT)
Age: 37
End: 2022-12-06

## 2022-12-06 PROCEDURE — 76818 FETAL BIOPHYS PROFILE W/NST: CPT

## 2022-12-06 PROCEDURE — 76816 OB US FOLLOW-UP PER FETUS: CPT

## 2022-12-09 ENCOUNTER — ASOB RESULT (OUTPATIENT)
Age: 37
End: 2022-12-09

## 2022-12-09 ENCOUNTER — INPATIENT (INPATIENT)
Facility: HOSPITAL | Age: 37
LOS: 3 days | Discharge: HOME | End: 2022-12-13
Attending: SPECIALIST | Admitting: SPECIALIST

## 2022-12-09 ENCOUNTER — APPOINTMENT (OUTPATIENT)
Dept: ANTEPARTUM | Facility: CLINIC | Age: 37
End: 2022-12-09

## 2022-12-09 ENCOUNTER — APPOINTMENT (OUTPATIENT)
Dept: MATERNAL FETAL MEDICINE | Facility: CLINIC | Age: 37
End: 2022-12-09

## 2022-12-09 ENCOUNTER — TRANSCRIPTION ENCOUNTER (OUTPATIENT)
Age: 37
End: 2022-12-09

## 2022-12-09 VITALS
RESPIRATION RATE: 18 BRPM | DIASTOLIC BLOOD PRESSURE: 67 MMHG | SYSTOLIC BLOOD PRESSURE: 110 MMHG | HEART RATE: 102 BPM | TEMPERATURE: 98 F

## 2022-12-09 DIAGNOSIS — O36.8330 MATERNAL CARE FOR ABNORMALITIES OF THE FETAL HEART RATE OR RHYTHM, THIRD TRIMESTER, NOT APPLICABLE OR UNSPECIFIED: ICD-10-CM

## 2022-12-09 DIAGNOSIS — O26.899 OTHER SPECIFIED PREGNANCY RELATED CONDITIONS, UNSPECIFIED TRIMESTER: ICD-10-CM

## 2022-12-09 DIAGNOSIS — Z98.891 HISTORY OF UTERINE SCAR FROM PREVIOUS SURGERY: Chronic | ICD-10-CM

## 2022-12-09 DIAGNOSIS — Z98.890 OTHER SPECIFIED POSTPROCEDURAL STATES: Chronic | ICD-10-CM

## 2022-12-09 LAB
BASOPHILS # BLD AUTO: 0.08 K/UL — SIGNIFICANT CHANGE UP (ref 0–0.2)
BASOPHILS NFR BLD AUTO: 0.7 % — SIGNIFICANT CHANGE UP (ref 0–2)
BLD GP AB SCN SERPL QL: NEGATIVE — SIGNIFICANT CHANGE UP
EOSINOPHIL # BLD AUTO: 0.05 K/UL — SIGNIFICANT CHANGE UP (ref 0–0.5)
EOSINOPHIL NFR BLD AUTO: 0.5 % — SIGNIFICANT CHANGE UP (ref 0–6)
GLUCOSE BLDC GLUCOMTR-MCNC: 82 MG/DL — SIGNIFICANT CHANGE UP (ref 70–99)
HCT VFR BLD CALC: 33.2 % — LOW (ref 34.5–45)
HGB BLD-MCNC: 9.4 G/DL — LOW (ref 11.5–15.5)
IANC: 6.9 K/UL — SIGNIFICANT CHANGE UP (ref 1.8–7.4)
IMM GRANULOCYTES NFR BLD AUTO: 6.8 % — HIGH (ref 0–0.9)
LYMPHOCYTES # BLD AUTO: 1.75 K/UL — SIGNIFICANT CHANGE UP (ref 1–3.3)
LYMPHOCYTES # BLD AUTO: 16 % — SIGNIFICANT CHANGE UP (ref 13–44)
MCHC RBC-ENTMCNC: 20.6 PG — LOW (ref 27–34)
MCHC RBC-ENTMCNC: 28.3 GM/DL — LOW (ref 32–36)
MCV RBC AUTO: 72.6 FL — LOW (ref 80–100)
MONOCYTES # BLD AUTO: 1.42 K/UL — HIGH (ref 0–0.9)
MONOCYTES NFR BLD AUTO: 13 % — SIGNIFICANT CHANGE UP (ref 2–14)
NEUTROPHILS # BLD AUTO: 6.9 K/UL — SIGNIFICANT CHANGE UP (ref 1.8–7.4)
NEUTROPHILS NFR BLD AUTO: 63 % — SIGNIFICANT CHANGE UP (ref 43–77)
NRBC # BLD: 4 /100 WBCS — HIGH (ref 0–0)
NRBC # FLD: 0.41 K/UL — HIGH (ref 0–0)
PLATELET # BLD AUTO: 256 K/UL — SIGNIFICANT CHANGE UP (ref 150–400)
RBC # BLD: 4.57 M/UL — SIGNIFICANT CHANGE UP (ref 3.8–5.2)
RBC # FLD: 27.4 % — HIGH (ref 10.3–14.5)
RH IG SCN BLD-IMP: POSITIVE — SIGNIFICANT CHANGE UP
WBC # BLD: 10.94 K/UL — HIGH (ref 3.8–10.5)
WBC # FLD AUTO: 10.94 K/UL — HIGH (ref 3.8–10.5)

## 2022-12-09 PROCEDURE — 99204 OFFICE O/P NEW MOD 45 MIN: CPT | Mod: 25

## 2022-12-09 PROCEDURE — 76818 FETAL BIOPHYS PROFILE W/NST: CPT

## 2022-12-09 PROCEDURE — 76818 FETAL BIOPHYS PROFILE W/NST: CPT | Mod: 26

## 2022-12-09 PROCEDURE — 88302 TISSUE EXAM BY PATHOLOGIST: CPT | Mod: 26

## 2022-12-09 DEVICE — SURGICEL FIBRILLAR 1 X 2": Type: IMPLANTABLE DEVICE | Status: FUNCTIONAL

## 2022-12-09 RX ORDER — SODIUM CHLORIDE 9 MG/ML
1000 INJECTION, SOLUTION INTRAVENOUS ONCE
Refills: 0 | Status: DISCONTINUED | OUTPATIENT
Start: 2022-12-09 | End: 2022-12-10

## 2022-12-09 RX ORDER — INFLUENZA VIRUS VACCINE 15; 15; 15; 15 UG/.5ML; UG/.5ML; UG/.5ML; UG/.5ML
0.5 SUSPENSION INTRAMUSCULAR ONCE
Refills: 0 | Status: DISCONTINUED | OUTPATIENT
Start: 2022-12-09 | End: 2022-12-13

## 2022-12-09 RX ORDER — FAMOTIDINE 10 MG/ML
20 INJECTION INTRAVENOUS ONCE
Refills: 0 | Status: COMPLETED | OUTPATIENT
Start: 2022-12-09 | End: 2022-12-09

## 2022-12-09 RX ORDER — OXYTOCIN 10 UNIT/ML
333.33 VIAL (ML) INJECTION
Qty: 20 | Refills: 0 | Status: DISCONTINUED | OUTPATIENT
Start: 2022-12-09 | End: 2022-12-10

## 2022-12-09 RX ORDER — CITRIC ACID/SODIUM CITRATE 300-500 MG
30 SOLUTION, ORAL ORAL ONCE
Refills: 0 | Status: COMPLETED | OUTPATIENT
Start: 2022-12-09 | End: 2022-12-09

## 2022-12-09 RX ORDER — OXYTOCIN 10 UNIT/ML
333.33 VIAL (ML) INJECTION
Qty: 20 | Refills: 0 | Status: DISCONTINUED | OUTPATIENT
Start: 2022-12-09 | End: 2022-12-09

## 2022-12-09 RX ORDER — SODIUM CHLORIDE 9 MG/ML
1000 INJECTION, SOLUTION INTRAVENOUS
Refills: 0 | Status: DISCONTINUED | OUTPATIENT
Start: 2022-12-09 | End: 2022-12-10

## 2022-12-09 RX ORDER — SODIUM CHLORIDE 9 MG/ML
1000 INJECTION, SOLUTION INTRAVENOUS ONCE
Refills: 0 | Status: DISCONTINUED | OUTPATIENT
Start: 2022-12-09 | End: 2022-12-09

## 2022-12-09 RX ORDER — SODIUM CHLORIDE 9 MG/ML
1000 INJECTION, SOLUTION INTRAVENOUS
Refills: 0 | Status: DISCONTINUED | OUTPATIENT
Start: 2022-12-09 | End: 2022-12-09

## 2022-12-09 RX ADMIN — Medication 30 MILLILITER(S): at 22:52

## 2022-12-09 RX ADMIN — FAMOTIDINE 20 MILLIGRAM(S): 10 INJECTION INTRAVENOUS at 22:52

## 2022-12-09 NOTE — OB RN INTRAOPERATIVE NOTE - NSSELHIDDEN_OBGYN_ALL_OB_FT
[NS_DeliveryAttending1_OBGYN_ALL_OB_FT:[id number]],[NS_DeliveryAssist1_OBGYN_ALL_OB_FT:[id number]],[NS_DeliveryRN_OBGYN_ALL_OB_FT:[id number]]

## 2022-12-09 NOTE — OB RN PATIENT PROFILE - EDUCATION OF THE PLACEMENT OF INFANT DURING SKIN TO SKIN: THE INFANT IS TO BE PLACED BELLY DOWN DIRECTLY ON PARENT'S CHEST, POSITIONED WITH INFANT'S FACE TOWARD PARENT'S FACE, SO THE PARENT CAN OBSERVE THE INFANT’S COLOR AT ALL TIMES.
Patient's , Arthur Ramos, would like to reschedule the patient's upcoming appointments. Please advise.     Phone #: [phone]  Thanks Statement Selected

## 2022-12-09 NOTE — OB PROVIDER H&P - NSICDXPASTMEDICALHX_GEN_ALL_CORE_FT
PAST MEDICAL HISTORY:  Carrier of hepatitis B 1994 age 9 blood transfusion    Iron deficiency anemia, unspecified

## 2022-12-09 NOTE — OB RN TRIAGE NOTE - NS_OBGYNHISTORY_OBGYN_ALL_OB_FT
c/s 6/8/2007 [facility] f 6#9  c/s 1/17/2020 f 7#  SAB x1  TOP X1 c/s 6/8/2007 [facility] f 6#9  c/s 1/17/2020 f 7#  SAB x1  TOP X1  iron transfusion x 4

## 2022-12-09 NOTE — OB PROVIDER H&P - NSHPPHYSICALEXAM_GEN_ALL_CORE
Vital Signs Last 24 Hrs  T(C): 36.7 (09 Dec 2022 19:01), Max: 36.7 (09 Dec 2022 19:01)  T(F): 98.1 (09 Dec 2022 19:01), Max: 98.1 (09 Dec 2022 19:01)  HR: 103 (09 Dec 2022 20:00) (102 - 103)  BP: 122/58 (09 Dec 2022 20:00) (110/67 - 122/58)  BP(mean): --  RR: 18 (09 Dec 2022 19:01) (18 - 18)  SpO2: --    Assessment reveals VSS  Abdomen soft, NT, gravid   mod. variability wihth accels occasional late decels with return to baseline, irregular contraction on toco   Transabdominal Ultrasound- bpp 8/8, cephalic, mellissa 6.5 posterior placenta   A&Ox3  Lungs- clear bilateral  Heart- normal rate and rhytmn      PLAN: Vital Signs Last 24 Hrs  T(C): 36.7 (09 Dec 2022 19:01), Max: 36.7 (09 Dec 2022 19:01)  T(F): 98.1 (09 Dec 2022 19:01), Max: 98.1 (09 Dec 2022 19:01)  HR: 103 (09 Dec 2022 20:00) (102 - 103)  BP: 122/58 (09 Dec 2022 20:00) (110/67 - 122/58)  BP(mean): --  RR: 18 (09 Dec 2022 19:01) (18 - 18)  SpO2: --    Assessment reveals VSS  Abdomen soft, NT, gravid   mod. variability wihth accels occasional late decels with return to baseline, irregular contraction on toco   Transabdominal Ultrasound- bpp 8/8, cephalic, mellissa 6.5 posterior placenta  A&Ox3  Lungs- clear bilateral  Heart- normal rate and rhytmn      PLAN:

## 2022-12-09 NOTE — OB PROVIDER H&P - NS_OBGYNHISTORY_OBGYN_ALL_OB_FT
AP course complicated by GDMA1   OB: c/s 6/8/2007 NRFHT f 6#9        c/s 1/17/2020 f 7#        SAB x1        TOP X1       iron transfusion x 4  GYN: Denies    GBS negative

## 2022-12-09 NOTE — OB RN DELIVERY SUMMARY - NSSELHIDDEN_OBGYN_ALL_OB_FT
[NS_DeliveryAttending1_OBGYN_ALL_OB_FT:[id number]],[NS_DeliveryAssist1_OBGYN_ALL_OB_FT:[id number]],[NS_DeliveryRN_OBGYN_ALL_OB_FT:[id number]]

## 2022-12-09 NOTE — OB PROVIDER H&P - PROBLEM SELECTOR PLAN 1
Patient admitted for repeat  for NRFHT   Plan d/w Dr. Queen  routine orders  for BTL  covid pcr negative from   NPO at 2300   2uPRBC on hold   consents signed by MD with patient

## 2022-12-09 NOTE — OB PROVIDER H&P - ASSESSMENT
37 year old  at 38.5 sent from ATU for NRFHT for delivery   -scheduled repeat  on   -Hx of GDMA1   -BPP , HARINDER 6.5, cephalic   Cat 2 FHT  mod. variability with accels, occasional late decels with return to baseline, irregular contracitons

## 2022-12-09 NOTE — OB PROVIDER H&P - HISTORY OF PRESENT ILLNESS
37 year old  at 38.5 was sent from ATU for NRFHT, decreased fetal and low fluid for delivery. Patient scheduled for repeat  on . Pregnancy complicated by GDMA1, denies other ob complications. Patient states she wants a BTL. Patient states she has felt the baby move since being in triage. Denies leaking of fluid, vaginal bleeding, reports occasional contractions.   PMH: Hep B carrier since  after blood transfusion, GDMA1, Iron deficiency anemia  PSH: D&C,   Allergies: NKDA  Meds: Tylenol, PNV, Iron transfusions   Denies hx of anxiety, depression, pp depression  denies use of etoh, drugs, tobacco during pregnancy

## 2022-12-09 NOTE — OB RN TRIAGE NOTE - FALL HARM RISK - UNIVERSAL INTERVENTIONS
Bed in lowest position, wheels locked, appropriate side rails in place/Call bell, personal items and telephone in reach/Instruct patient to call for assistance before getting out of bed or chair/Non-slip footwear when patient is out of bed/[place] to call system/Physically safe environment - no spills, clutter or unnecessary equipment/Purposeful Proactive Rounding/Room/bathroom lighting operational, light cord in reach

## 2022-12-09 NOTE — OB RN TRIAGE NOTE - NSICDXPASTMEDICALHX_GEN_ALL_CORE_FT
PAST MEDICAL HISTORY:  Carrier of hepatitis B 1994 age 9 blood transfusion    High risk pregnancy with low PAPPA (pregnancy-associated plasma protein A)     Iron deficiency anemia, unspecified     Miscarriage 2018    Termination of pregnancy (fetus)      PAST MEDICAL HISTORY:  Carrier of hepatitis B 1994 age 9 blood transfusion    Iron deficiency anemia, unspecified

## 2022-12-09 NOTE — OB RN DELIVERY SUMMARY - NS_SEPSISRSKCALC_OBGYN_ALL_OB_FT
No temperature has been documented for this patient in CPN or on the OB Flowsheet. Ensure the highest temperature during labor was documented on the OB Flowsheet.  No gestational age at birth has been documented. Ensure delivery date/time has been entered above.  Rupture of membranes must be entered above.   EOS calculated successfully. EOS Risk Factor: 0.5/1000 live births ([facility] national incidence); GA=38w6d; Temp=98.2; ROM=0.017; GBS='Negative'; Antibiotics='No antibiotics or any antibiotics < 2 hrs prior to birth'

## 2022-12-09 NOTE — OB PROVIDER H&P - NS_FHOBSERVED_OBGYN_ALL_OB
well developed, well nourished , in no acute distress , ambulating without difficulty , normal communication ability
Yes

## 2022-12-10 ENCOUNTER — TRANSCRIPTION ENCOUNTER (OUTPATIENT)
Age: 37
End: 2022-12-10

## 2022-12-10 LAB
ALBUMIN SERPL ELPH-MCNC: 2.5 G/DL — LOW (ref 3.3–5)
ALBUMIN SERPL ELPH-MCNC: 2.7 G/DL — LOW (ref 3.3–5)
ALBUMIN SERPL ELPH-MCNC: 2.8 G/DL — LOW (ref 3.3–5)
ALP SERPL-CCNC: 131 U/L — HIGH (ref 40–120)
ALP SERPL-CCNC: 132 U/L — HIGH (ref 40–120)
ALP SERPL-CCNC: 151 U/L — HIGH (ref 40–120)
ALT FLD-CCNC: 13 U/L — SIGNIFICANT CHANGE UP (ref 4–33)
ALT FLD-CCNC: 14 U/L — SIGNIFICANT CHANGE UP (ref 4–33)
ALT FLD-CCNC: 14 U/L — SIGNIFICANT CHANGE UP (ref 4–33)
ANION GAP SERPL CALC-SCNC: 11 MMOL/L — SIGNIFICANT CHANGE UP (ref 7–14)
ANION GAP SERPL CALC-SCNC: 12 MMOL/L — SIGNIFICANT CHANGE UP (ref 7–14)
ANION GAP SERPL CALC-SCNC: 9 MMOL/L — SIGNIFICANT CHANGE UP (ref 7–14)
ANISOCYTOSIS BLD QL: SLIGHT — SIGNIFICANT CHANGE UP
APTT BLD: 25.2 SEC — LOW (ref 27–36.3)
APTT BLD: 26.1 SEC — LOW (ref 27–36.3)
APTT BLD: 26.7 SEC — LOW (ref 27–36.3)
APTT BLD: 27 SEC — SIGNIFICANT CHANGE UP (ref 27–36.3)
AST SERPL-CCNC: 29 U/L — SIGNIFICANT CHANGE UP (ref 4–32)
AST SERPL-CCNC: 31 U/L — SIGNIFICANT CHANGE UP (ref 4–32)
AST SERPL-CCNC: 35 U/L — HIGH (ref 4–32)
BASOPHILS # BLD AUTO: 0 K/UL — SIGNIFICANT CHANGE UP (ref 0–0.2)
BASOPHILS # BLD AUTO: 0 K/UL — SIGNIFICANT CHANGE UP (ref 0–0.2)
BASOPHILS NFR BLD AUTO: 0 % — SIGNIFICANT CHANGE UP (ref 0–2)
BASOPHILS NFR BLD AUTO: 0 % — SIGNIFICANT CHANGE UP (ref 0–2)
BILIRUB SERPL-MCNC: 0.4 MG/DL — SIGNIFICANT CHANGE UP (ref 0.2–1.2)
BILIRUB SERPL-MCNC: 0.6 MG/DL — SIGNIFICANT CHANGE UP (ref 0.2–1.2)
BILIRUB SERPL-MCNC: 0.7 MG/DL — SIGNIFICANT CHANGE UP (ref 0.2–1.2)
BLOOD GAS ARTERIAL COMPREHENSIVE RESULT: SIGNIFICANT CHANGE UP
BUN SERPL-MCNC: 10 MG/DL — SIGNIFICANT CHANGE UP (ref 7–23)
BUN SERPL-MCNC: 7 MG/DL — SIGNIFICANT CHANGE UP (ref 7–23)
BUN SERPL-MCNC: 7 MG/DL — SIGNIFICANT CHANGE UP (ref 7–23)
CALCIUM SERPL-MCNC: 8.3 MG/DL — LOW (ref 8.4–10.5)
CALCIUM SERPL-MCNC: 8.5 MG/DL — SIGNIFICANT CHANGE UP (ref 8.4–10.5)
CALCIUM SERPL-MCNC: 8.7 MG/DL — SIGNIFICANT CHANGE UP (ref 8.4–10.5)
CHLORIDE SERPL-SCNC: 103 MMOL/L — SIGNIFICANT CHANGE UP (ref 98–107)
CHLORIDE SERPL-SCNC: 106 MMOL/L — SIGNIFICANT CHANGE UP (ref 98–107)
CHLORIDE SERPL-SCNC: 109 MMOL/L — HIGH (ref 98–107)
CO2 SERPL-SCNC: 17 MMOL/L — LOW (ref 22–31)
CO2 SERPL-SCNC: 18 MMOL/L — LOW (ref 22–31)
CO2 SERPL-SCNC: 19 MMOL/L — LOW (ref 22–31)
COVID-19 SPIKE DOMAIN AB INTERP: POSITIVE
COVID-19 SPIKE DOMAIN ANTIBODY RESULT: >250 U/ML — HIGH
CREAT SERPL-MCNC: 0.5 MG/DL — SIGNIFICANT CHANGE UP (ref 0.5–1.3)
CREAT SERPL-MCNC: 0.57 MG/DL — SIGNIFICANT CHANGE UP (ref 0.5–1.3)
CREAT SERPL-MCNC: 0.65 MG/DL — SIGNIFICANT CHANGE UP (ref 0.5–1.3)
DACRYOCYTES BLD QL SMEAR: SLIGHT — SIGNIFICANT CHANGE UP
EGFR: 117 ML/MIN/1.73M2 — SIGNIFICANT CHANGE UP
EGFR: 121 ML/MIN/1.73M2 — SIGNIFICANT CHANGE UP
EGFR: 125 ML/MIN/1.73M2 — SIGNIFICANT CHANGE UP
EOSINOPHIL # BLD AUTO: 0 K/UL — SIGNIFICANT CHANGE UP (ref 0–0.5)
EOSINOPHIL # BLD AUTO: 0.01 K/UL — SIGNIFICANT CHANGE UP (ref 0–0.5)
EOSINOPHIL NFR BLD AUTO: 0 % — SIGNIFICANT CHANGE UP (ref 0–6)
EOSINOPHIL NFR BLD AUTO: 0.3 % — SIGNIFICANT CHANGE UP (ref 0–6)
FIBRINOGEN PPP-MCNC: 296 MG/DL — SIGNIFICANT CHANGE UP (ref 200–465)
FIBRINOGEN PPP-MCNC: 334 MG/DL — SIGNIFICANT CHANGE UP (ref 200–465)
FIBRINOGEN PPP-MCNC: 347 MG/DL — SIGNIFICANT CHANGE UP (ref 200–465)
FIBRINOGEN PPP-MCNC: 377 MG/DL — SIGNIFICANT CHANGE UP (ref 200–465)
GIANT PLATELETS BLD QL SMEAR: PRESENT — SIGNIFICANT CHANGE UP
GLUCOSE SERPL-MCNC: 105 MG/DL — HIGH (ref 70–99)
GLUCOSE SERPL-MCNC: 124 MG/DL — HIGH (ref 70–99)
GLUCOSE SERPL-MCNC: 96 MG/DL — SIGNIFICANT CHANGE UP (ref 70–99)
HCT VFR BLD CALC: 34.3 % — LOW (ref 34.5–45)
HCT VFR BLD CALC: 42 % — SIGNIFICANT CHANGE UP (ref 34.5–45)
HCT VFR BLD CALC: 48.4 % — HIGH (ref 34.5–45)
HCT VFR BLD CALC: 9.1 % — CRITICAL LOW (ref 34.5–45)
HGB BLD-MCNC: 10.8 G/DL — LOW (ref 11.5–15.5)
HGB BLD-MCNC: 12.9 G/DL — SIGNIFICANT CHANGE UP (ref 11.5–15.5)
HGB BLD-MCNC: 14.8 G/DL — SIGNIFICANT CHANGE UP (ref 11.5–15.5)
HGB BLD-MCNC: 2.3 G/DL — CRITICAL LOW (ref 11.5–15.5)
IANC: 10.75 K/UL — HIGH (ref 1.8–7.4)
IANC: 2.07 K/UL — SIGNIFICANT CHANGE UP (ref 1.8–7.4)
IMM GRANULOCYTES NFR BLD AUTO: 7.1 % — HIGH (ref 0–0.9)
INR BLD: 0.96 RATIO — SIGNIFICANT CHANGE UP (ref 0.88–1.16)
INR BLD: 0.98 RATIO — SIGNIFICANT CHANGE UP (ref 0.88–1.16)
INR BLD: 0.99 RATIO — SIGNIFICANT CHANGE UP (ref 0.88–1.16)
INR BLD: 1.02 RATIO — SIGNIFICANT CHANGE UP (ref 0.88–1.16)
LACTATE SERPL-SCNC: 1.5 MMOL/L — SIGNIFICANT CHANGE UP (ref 0.5–2)
LACTATE SERPL-SCNC: 1.6 MMOL/L — SIGNIFICANT CHANGE UP (ref 0.5–2)
LACTATE SERPL-SCNC: 1.9 MMOL/L — SIGNIFICANT CHANGE UP (ref 0.5–2)
LDH SERPL L TO P-CCNC: 408 U/L — HIGH (ref 135–225)
LDH SERPL L TO P-CCNC: 464 U/L — HIGH (ref 135–225)
LYMPHOCYTES # BLD AUTO: 0.7 K/UL — LOW (ref 1–3.3)
LYMPHOCYTES # BLD AUTO: 1.14 K/UL — SIGNIFICANT CHANGE UP (ref 1–3.3)
LYMPHOCYTES # BLD AUTO: 21.7 % — SIGNIFICANT CHANGE UP (ref 13–44)
LYMPHOCYTES # BLD AUTO: 7.9 % — LOW (ref 13–44)
MCHC RBC-ENTMCNC: 20.2 PG — LOW (ref 27–34)
MCHC RBC-ENTMCNC: 22.9 PG — LOW (ref 27–34)
MCHC RBC-ENTMCNC: 23.1 PG — LOW (ref 27–34)
MCHC RBC-ENTMCNC: 23.1 PG — LOW (ref 27–34)
MCHC RBC-ENTMCNC: 25.3 GM/DL — LOW (ref 32–36)
MCHC RBC-ENTMCNC: 30.6 GM/DL — LOW (ref 32–36)
MCHC RBC-ENTMCNC: 30.7 GM/DL — LOW (ref 32–36)
MCHC RBC-ENTMCNC: 31.5 GM/DL — LOW (ref 32–36)
MCV RBC AUTO: 73.4 FL — LOW (ref 80–100)
MCV RBC AUTO: 74.6 FL — LOW (ref 80–100)
MCV RBC AUTO: 75.4 FL — LOW (ref 80–100)
MCV RBC AUTO: 79.8 FL — LOW (ref 80–100)
METAMYELOCYTES # FLD: 0.9 % — SIGNIFICANT CHANGE UP (ref 0–1)
MICROCYTES BLD QL: SLIGHT — SIGNIFICANT CHANGE UP
MONOCYTES # BLD AUTO: 0.22 K/UL — SIGNIFICANT CHANGE UP (ref 0–0.9)
MONOCYTES # BLD AUTO: 1.01 K/UL — HIGH (ref 0–0.9)
MONOCYTES NFR BLD AUTO: 6.8 % — SIGNIFICANT CHANGE UP (ref 2–14)
MONOCYTES NFR BLD AUTO: 7 % — SIGNIFICANT CHANGE UP (ref 2–14)
NEUTROPHILS # BLD AUTO: 11.75 K/UL — HIGH (ref 1.8–7.4)
NEUTROPHILS # BLD AUTO: 2.07 K/UL — SIGNIFICANT CHANGE UP (ref 1.8–7.4)
NEUTROPHILS NFR BLD AUTO: 64.1 % — SIGNIFICANT CHANGE UP (ref 43–77)
NEUTROPHILS NFR BLD AUTO: 75.4 % — SIGNIFICANT CHANGE UP (ref 43–77)
NEUTS BAND # BLD: 6.2 % — HIGH (ref 0–6)
NRBC # BLD: 1 /100 — HIGH (ref 0–0)
NRBC # BLD: 3 /100 WBCS — HIGH (ref 0–0)
NRBC # BLD: 3 /100 WBCS — HIGH (ref 0–0)
NRBC # BLD: 4 /100 WBCS — HIGH (ref 0–0)
NRBC # FLD: 0.09 K/UL — HIGH (ref 0–0)
NRBC # FLD: 0.64 K/UL — HIGH (ref 0–0)
NRBC # FLD: 0.92 K/UL — HIGH (ref 0–0)
OVALOCYTES BLD QL SMEAR: SIGNIFICANT CHANGE UP
PLAT MORPH BLD: ABNORMAL
PLATELET # BLD AUTO: 194 K/UL — SIGNIFICANT CHANGE UP (ref 150–400)
PLATELET # BLD AUTO: 225 K/UL — SIGNIFICANT CHANGE UP (ref 150–400)
PLATELET # BLD AUTO: 238 K/UL — SIGNIFICANT CHANGE UP (ref 150–400)
PLATELET # BLD AUTO: 62 K/UL — LOW (ref 150–400)
PLATELET COUNT - ESTIMATE: NORMAL — SIGNIFICANT CHANGE UP
POIKILOCYTOSIS BLD QL AUTO: SLIGHT — SIGNIFICANT CHANGE UP
POLYCHROMASIA BLD QL SMEAR: SIGNIFICANT CHANGE UP
POTASSIUM SERPL-MCNC: 3.9 MMOL/L — SIGNIFICANT CHANGE UP (ref 3.5–5.3)
POTASSIUM SERPL-MCNC: 4.3 MMOL/L — SIGNIFICANT CHANGE UP (ref 3.5–5.3)
POTASSIUM SERPL-MCNC: 4.5 MMOL/L — SIGNIFICANT CHANGE UP (ref 3.5–5.3)
POTASSIUM SERPL-SCNC: 3.9 MMOL/L — SIGNIFICANT CHANGE UP (ref 3.5–5.3)
POTASSIUM SERPL-SCNC: 4.3 MMOL/L — SIGNIFICANT CHANGE UP (ref 3.5–5.3)
POTASSIUM SERPL-SCNC: 4.5 MMOL/L — SIGNIFICANT CHANGE UP (ref 3.5–5.3)
PROT SERPL-MCNC: 4.9 G/DL — LOW (ref 6–8.3)
PROT SERPL-MCNC: 5 G/DL — LOW (ref 6–8.3)
PROT SERPL-MCNC: 5.4 G/DL — LOW (ref 6–8.3)
PROTHROM AB SERPL-ACNC: 11.1 SEC — SIGNIFICANT CHANGE UP (ref 10.5–13.4)
PROTHROM AB SERPL-ACNC: 11.4 SEC — SIGNIFICANT CHANGE UP (ref 10.5–13.4)
PROTHROM AB SERPL-ACNC: 11.5 SEC — SIGNIFICANT CHANGE UP (ref 10.5–13.4)
PROTHROM AB SERPL-ACNC: 11.8 SEC — SIGNIFICANT CHANGE UP (ref 10.5–13.4)
RBC # BLD: 1.14 M/UL — LOW (ref 3.8–5.2)
RBC # BLD: 4.67 M/UL — SIGNIFICANT CHANGE UP (ref 3.8–5.2)
RBC # BLD: 5.63 M/UL — HIGH (ref 3.8–5.2)
RBC # BLD: 6.42 M/UL — HIGH (ref 3.8–5.2)
RBC # FLD: 26.8 % — HIGH (ref 10.3–14.5)
RBC # FLD: 27.4 % — HIGH (ref 10.3–14.5)
RBC # FLD: 27.9 % — HIGH (ref 10.3–14.5)
RBC # FLD: 29.4 % — HIGH (ref 10.3–14.5)
RBC BLD AUTO: ABNORMAL
SARS-COV-2 IGG+IGM SERPL QL IA: >250 U/ML — HIGH
SARS-COV-2 IGG+IGM SERPL QL IA: POSITIVE
SODIUM SERPL-SCNC: 132 MMOL/L — LOW (ref 135–145)
SODIUM SERPL-SCNC: 136 MMOL/L — SIGNIFICANT CHANGE UP (ref 135–145)
SODIUM SERPL-SCNC: 136 MMOL/L — SIGNIFICANT CHANGE UP (ref 135–145)
T PALLIDUM AB TITR SER: NEGATIVE — SIGNIFICANT CHANGE UP
URATE SERPL-MCNC: 5.5 MG/DL — SIGNIFICANT CHANGE UP (ref 2.5–7)
URATE SERPL-MCNC: 6 MG/DL — SIGNIFICANT CHANGE UP (ref 2.5–7)
VARIANT LYMPHS # BLD: 2.6 % — SIGNIFICANT CHANGE UP (ref 0–6)
WBC # BLD: 14.4 K/UL — HIGH (ref 3.8–10.5)
WBC # BLD: 22.46 K/UL — HIGH (ref 3.8–10.5)
WBC # BLD: 23.93 K/UL — HIGH (ref 3.8–10.5)
WBC # BLD: 3.23 K/UL — LOW (ref 3.8–10.5)
WBC # FLD AUTO: 14.4 K/UL — HIGH (ref 3.8–10.5)
WBC # FLD AUTO: 22.46 K/UL — HIGH (ref 3.8–10.5)
WBC # FLD AUTO: 23.93 K/UL — HIGH (ref 3.8–10.5)
WBC # FLD AUTO: 3.23 K/UL — LOW (ref 3.8–10.5)

## 2022-12-10 RX ORDER — ACETAMINOPHEN 500 MG
975 TABLET ORAL
Refills: 0 | Status: DISCONTINUED | OUTPATIENT
Start: 2022-12-10 | End: 2022-12-13

## 2022-12-10 RX ORDER — IBUPROFEN 200 MG
600 TABLET ORAL EVERY 6 HOURS
Refills: 0 | Status: DISCONTINUED | OUTPATIENT
Start: 2022-12-10 | End: 2022-12-13

## 2022-12-10 RX ORDER — DIPHENHYDRAMINE HCL 50 MG
25 CAPSULE ORAL EVERY 6 HOURS
Refills: 0 | Status: COMPLETED | OUTPATIENT
Start: 2022-12-10 | End: 2023-11-08

## 2022-12-10 RX ORDER — HEPARIN SODIUM 5000 [USP'U]/ML
5000 INJECTION INTRAVENOUS; SUBCUTANEOUS EVERY 12 HOURS
Refills: 0 | Status: DISCONTINUED | OUTPATIENT
Start: 2022-12-10 | End: 2022-12-13

## 2022-12-10 RX ORDER — SENNA PLUS 8.6 MG/1
1 TABLET ORAL
Refills: 0 | Status: DISCONTINUED | OUTPATIENT
Start: 2022-12-10 | End: 2022-12-13

## 2022-12-10 RX ORDER — ACETAMINOPHEN 500 MG
3 TABLET ORAL
Qty: 0 | Refills: 0 | DISCHARGE
Start: 2022-12-10

## 2022-12-10 RX ORDER — FERROUS SULFATE 325(65) MG
325 TABLET ORAL DAILY
Refills: 0 | Status: DISCONTINUED | OUTPATIENT
Start: 2022-12-10 | End: 2022-12-13

## 2022-12-10 RX ORDER — DIPHENHYDRAMINE HCL 50 MG
25 CAPSULE ORAL ONCE
Refills: 0 | Status: COMPLETED | OUTPATIENT
Start: 2022-12-10 | End: 2022-12-11

## 2022-12-10 RX ORDER — LORATADINE 10 MG/1
1 TABLET ORAL
Qty: 0 | Refills: 0 | DISCHARGE

## 2022-12-10 RX ORDER — OXYTOCIN 10 UNIT/ML
333.33 VIAL (ML) INJECTION
Qty: 20 | Refills: 0 | Status: DISCONTINUED | OUTPATIENT
Start: 2022-12-10 | End: 2022-12-10

## 2022-12-10 RX ORDER — SIMETHICONE 80 MG/1
80 TABLET, CHEWABLE ORAL EVERY 4 HOURS
Refills: 0 | Status: DISCONTINUED | OUTPATIENT
Start: 2022-12-10 | End: 2022-12-13

## 2022-12-10 RX ORDER — MAGNESIUM HYDROXIDE 400 MG/1
30 TABLET, CHEWABLE ORAL
Refills: 0 | Status: DISCONTINUED | OUTPATIENT
Start: 2022-12-10 | End: 2022-12-13

## 2022-12-10 RX ORDER — LANOLIN
1 OINTMENT (GRAM) TOPICAL EVERY 6 HOURS
Refills: 0 | Status: DISCONTINUED | OUTPATIENT
Start: 2022-12-10 | End: 2022-12-13

## 2022-12-10 RX ORDER — ACETAMINOPHEN 500 MG
500 TABLET ORAL
Qty: 0 | Refills: 0 | DISCHARGE

## 2022-12-10 RX ORDER — TETANUS TOXOID, REDUCED DIPHTHERIA TOXOID AND ACELLULAR PERTUSSIS VACCINE, ADSORBED 5; 2.5; 8; 8; 2.5 [IU]/.5ML; [IU]/.5ML; UG/.5ML; UG/.5ML; UG/.5ML
0.5 SUSPENSION INTRAMUSCULAR ONCE
Refills: 0 | Status: DISCONTINUED | OUTPATIENT
Start: 2022-12-10 | End: 2022-12-13

## 2022-12-10 RX ORDER — OXYCODONE HYDROCHLORIDE 5 MG/1
5 TABLET ORAL
Refills: 0 | Status: DISCONTINUED | OUTPATIENT
Start: 2022-12-10 | End: 2022-12-13

## 2022-12-10 RX ORDER — DIPHENOXYLATE HCL/ATROPINE 2.5-.025MG
2 TABLET ORAL ONCE
Refills: 0 | Status: DISCONTINUED | OUTPATIENT
Start: 2022-12-10 | End: 2022-12-10

## 2022-12-10 RX ORDER — IBUPROFEN 200 MG
600 TABLET ORAL EVERY 6 HOURS
Refills: 0 | Status: COMPLETED | OUTPATIENT
Start: 2022-12-10 | End: 2023-11-08

## 2022-12-10 RX ORDER — SODIUM CHLORIDE 9 MG/ML
1000 INJECTION, SOLUTION INTRAVENOUS
Refills: 0 | Status: DISCONTINUED | OUTPATIENT
Start: 2022-12-10 | End: 2022-12-12

## 2022-12-10 RX ORDER — OXYTOCIN 10 UNIT/ML
333.33 VIAL (ML) INJECTION
Qty: 20 | Refills: 0 | Status: DISCONTINUED | OUTPATIENT
Start: 2022-12-10 | End: 2022-12-11

## 2022-12-10 RX ORDER — IBUPROFEN 200 MG
1 TABLET ORAL
Qty: 0 | Refills: 0 | DISCHARGE
Start: 2022-12-10

## 2022-12-10 RX ORDER — KETOROLAC TROMETHAMINE 30 MG/ML
30 SYRINGE (ML) INJECTION EVERY 6 HOURS
Refills: 0 | Status: COMPLETED | OUTPATIENT
Start: 2022-12-10 | End: 2022-12-11

## 2022-12-10 RX ORDER — SODIUM CHLORIDE 9 MG/ML
1000 INJECTION, SOLUTION INTRAVENOUS
Refills: 0 | Status: DISCONTINUED | OUTPATIENT
Start: 2022-12-10 | End: 2022-12-11

## 2022-12-10 RX ADMIN — Medication 2 TABLET(S): at 00:38

## 2022-12-10 RX ADMIN — Medication 0.2 MILLIGRAM(S): at 22:59

## 2022-12-10 RX ADMIN — Medication 975 MILLIGRAM(S): at 23:30

## 2022-12-10 RX ADMIN — Medication 0.2 MILLIGRAM(S): at 10:05

## 2022-12-10 RX ADMIN — Medication 1000 MILLIUNIT(S)/MIN: at 07:01

## 2022-12-10 RX ADMIN — Medication 0.2 MILLIGRAM(S): at 14:17

## 2022-12-10 RX ADMIN — Medication 975 MILLIGRAM(S): at 16:02

## 2022-12-10 RX ADMIN — Medication 975 MILLIGRAM(S): at 10:30

## 2022-12-10 RX ADMIN — Medication 30 MILLIGRAM(S): at 08:41

## 2022-12-10 RX ADMIN — Medication 600 MILLIGRAM(S): at 18:03

## 2022-12-10 RX ADMIN — Medication 0.2 MILLIGRAM(S): at 06:12

## 2022-12-10 RX ADMIN — Medication 30 MILLIGRAM(S): at 14:17

## 2022-12-10 RX ADMIN — SODIUM CHLORIDE 75 MILLILITER(S): 9 INJECTION, SOLUTION INTRAVENOUS at 07:01

## 2022-12-10 RX ADMIN — Medication 975 MILLIGRAM(S): at 22:58

## 2022-12-10 RX ADMIN — Medication 30 MILLIGRAM(S): at 14:57

## 2022-12-10 RX ADMIN — HEPARIN SODIUM 5000 UNIT(S): 5000 INJECTION INTRAVENOUS; SUBCUTANEOUS at 12:07

## 2022-12-10 RX ADMIN — Medication 600 MILLIGRAM(S): at 19:00

## 2022-12-10 RX ADMIN — Medication 0.2 MILLIGRAM(S): at 18:03

## 2022-12-10 RX ADMIN — SODIUM CHLORIDE 125 MILLILITER(S): 9 INJECTION, SOLUTION INTRAVENOUS at 08:46

## 2022-12-10 RX ADMIN — Medication 30 MILLIGRAM(S): at 09:30

## 2022-12-10 RX ADMIN — Medication 975 MILLIGRAM(S): at 10:05

## 2022-12-10 NOTE — DISCHARGE NOTE OB - HOSPITAL COURSE
repeat  section  bilateral salpingectomy  anemia with transfusion repeat  section  bilateral salpingectomy  acute blood loss anemia with transfusion

## 2022-12-10 NOTE — OB NEONATOLOGY/PEDIATRICIAN DELIVERY SUMMARY - NSPEDSNEONOTESA_OBGYN_ALL_OB_FT
home Peds called to OR for Cat II tracing. 38.6wk AGA female born via repeat CS indicated for decreased fetal movement to a 38y/o  mother. Prenatal history of anemia (iron transfusions), GDMA1. Maternal labs include Blood Type B+, HIV - , RPR NR , Rubella I , Hep B + , GBS - , COVID - . AROM at uterine incision with clear fluids. Baby emerged vigorous, crying, was warmed, dried suctioned and stimulated with APGARS of 9/9. Resuscitation included: CPAP x6min total at 5mm/21% over first 30MOL for mild-mod nasal flaring, subcostal, intercostal, suprasternal retraction, with improvement. Mom plans to initiate breastfeeding, consents Hep B vaccine. EOS 0.03.    Physical Exam: following CPAP  Gen: no acute distress, +grimace  HEENT:  anterior fontanel open soft and flat, nondysmorphic facies, no cleft lip/palate, ears normal set, no ear pits or tags, nares clinically patent  Resp: No grunting or retractions, good air entry b/l, clear to auscultation bilaterally +mild, intermittent nasal flaring  Cardio: Present S1/S2, regular rate and rhythm, no murmurs  Abd: soft, non tender, non distended, umbilical cord with 3 vessels  Neuro: +palmar and plantar grasp, +suck, +kathy, normal tone  Extremities: negative francois and ortolani maneuvers, moving all extremities, no clavicular crepitus or stepoff  Skin: pink, warm  Genitals: Normal female anatomy, Godfrey 1, anus patent

## 2022-12-10 NOTE — DISCHARGE NOTE OB - CARE PLAN
1 Principal Discharge DX:	 delivery delivered  Assessment and plan of treatment:	recovery  Secondary Diagnosis:	Status post bilateral salpingectomy

## 2022-12-10 NOTE — DISCHARGE NOTE OB - NS MD DC FALL RISK RISK
For information on Fall & Injury Prevention, visit: [email or web address] OR  [email or web address] OR  https://www.cdc.gov/steadi/patient.html

## 2022-12-10 NOTE — PROVIDER CONTACT NOTE (OTHER) - ASSESSMENT
fundus firm and at umbilicus with light lochia. pain well controlled. VSS. a-line appropriate. villarreal draining adequately.

## 2022-12-10 NOTE — PROVIDER CONTACT NOTE (OTHER) - ACTION/TREATMENT ORDERED:
heparin to be held at this time until labs result. okay to given toradol q6h for pain per MD Samuel. patient allowed to drink but no food at this time.

## 2022-12-10 NOTE — DISCHARGE NOTE OB - MEDICATION SUMMARY - MEDICATIONS TO TAKE
I will START or STAY ON the medications listed below when I get home from the hospital:    acetaminophen 325 mg oral tablet  -- 3 tab(s) by mouth every 6 hours  -- Indication: For  section    ibuprofen 600 mg oral tablet  -- 1 tab(s) by mouth every 6 hours  -- Indication: For  section   I will START or STAY ON the medications listed below when I get home from the hospital:    acetaminophen 325 mg oral tablet  -- 3 tab(s) by mouth every 6 hours  -- Indication: For  section    ibuprofen 600 mg oral tablet  -- 1 tab(s) by mouth every 6 hours  -- Indication: For  section    ferrous sulfate 325 mg (65 mg elemental iron) oral tablet  -- 1 tab(s) by mouth 3 times a day   -- Check with your doctor before becoming pregnant.  Do not chew, break, or crush.  May discolor urine or feces.    -- Indication: For anemia    senna (sennosides) 25 mg oral tablet  -- 1 tab(s) by mouth once a day   -- Check with your doctor before becoming pregnant.  Do not chew, break, or crush.    -- Indication: For Constipation    ascorbic acid 500 mg oral capsule  -- 1 cap(s) by mouth once a day   -- Indication: For absorpiton of iron medication

## 2022-12-10 NOTE — OB PROVIDER DELIVERY SUMMARY - DELIVERY COMPLICATIONS; PERIPARTUM HEMORRHAGE
Patient received Hemabate x3, Methergine x2, Cytotec MT, TXA, 40u Pit x2, Modified B-Hernandez Sutures placed

## 2022-12-10 NOTE — CHART NOTE - NSCHARTNOTEFT_GEN_A_CORE
Patient sent in from ATU secondary to Cat II tracing and history of previous  x2. She also desired salpingectomy for multiparity.  Cat II tracing noted while patient was in triage.  Overall reassuring.  Decision made to wait for NPO status before performing repeat c/s and bilateral salpingectomy.    During , after delivery of fetus and placenta, uterine atony was noted.  The uterus was closed in 3 layers with global uterine atony noted after closure.  Pitocin x 2, TXA, IM Methergine x2, Intrauterine hemabate x 2, rectal cytotec all given.  Uterus still noted to have atony so B-matamoros sutures were placed, with no further accumulation of blood noted.  Uterus was then returned to the abdomen.  Fundus remained below the umbilicus for over 30 min.  QBL 1200 at this time.  During this time, decision also made to give pt 1 U PRBC, CBC and coags were drawn at the same time.  Pt's BP was noted to be low (60s/10s), per anesthesia, BP cuff not reading properly and inaccurate, decision was made to place a-line for a more accurate BP reading.  Pt was mentating and did not lose consciousness during this time.  However, pt received vasopressin and phenylephrine with improvement in BP readings based on the cuff.  Another unit of PRBC was given.  A-line was then placed and BPs were noted to be normal/elevated, pt came off pressors and BPs remained stable.   SICU consult was obtained, however, due to patient being stable off pressors, she did not meet criteria for SICU admission at this time.  CBC resulted and hgb was 2, awaiting ABG results.   Pt also to receive 1 U cryo and 1 more unit of PRBC.   Disc with pt events and she understands, also disc possible need for hysterectomy if atony / bleeding do not improve.  All questions answered.

## 2022-12-10 NOTE — OB PROVIDER DELIVERY SUMMARY - NSSELHIDDEN_OBGYN_ALL_OB_FT
[NS_DeliveryAttending1_OBGYN_ALL_OB_FT:[id number]],[NS_DeliveryAssist1_OBGYN_ALL_OB_FT:[id number]],[NS_DeliveryRN_OBGYN_ALL_OB_FT:[id number]]

## 2022-12-10 NOTE — DISCHARGE NOTE OB - PATIENT PORTAL LINK FT
You can access the FollowMyHealth Patient Portal offered by [facility] by registering at the following website: [email or web address]. By joining [facility]’s FollowMyHealth portal, you will also be able to view your health information using other applications (apps) compatible with our system.

## 2022-12-10 NOTE — PACU DISCHARGE NOTE - COMMENTS
Axillary a line pulled. Labs wnl. Pressure held for 15 mintues.   Epidural pulled with blue cap intact.

## 2022-12-10 NOTE — PROVIDER CONTACT NOTE (OTHER) - BACKGROUND
patient status post repeat c/s with BTL. QBL 1547 received 3 PRBC and 1 cryo in the OR. A-line placed in the OR.

## 2022-12-10 NOTE — DISCHARGE NOTE OB - CARE PROVIDER_API CALL
Denice Paige)  Obstetrics and Gynecology  [address] [place] [address]  Phone: [phone]  Fax: [phone]  Follow Up Time:

## 2022-12-10 NOTE — PROVIDER CONTACT NOTE (OTHER) - RECOMMENDATIONS
CBC/coags/CMP/lactate to be drawn at 0900. a-line and epi catheter to remain in place until labs result.

## 2022-12-10 NOTE — DISCHARGE NOTE OB - MEDICATION SUMMARY - MEDICATIONS TO STOP TAKING
I will STOP taking the medications listed below when I get home from the hospital:    loratadine 5 mg oral tablet, chewable  -- 1 tab(s) by mouth once a day, As Needed

## 2022-12-10 NOTE — PACU DISCHARGE NOTE - THE ANESTHESIA ORDERS USED IN THE PACU ORDER SET WILL BE DISCONTINUED UPON TRANSFER OF THIS PATIENT
Statement Selected Link To The Follow Chief Complaint: third Detail Level: Zone Note Text (......Xxx Chief Complaint.): This diagnosis correlates with the Link To The Follow Chief Complaint: second

## 2022-12-10 NOTE — OB PROVIDER DELIVERY SUMMARY - NSATTENDATTESTATION_OBGYN_A_OB
Picato Counseling:  I discussed with the patient the risks of Picato including but not limited to erythema, scaling, itching, weeping, crusting, and pain. I was physically present and participated in this delivery.

## 2022-12-10 NOTE — OB PROVIDER DELIVERY SUMMARY - NSCAT2TRACINGDETAIL_OBGYN_A_OB
This hernia is reducible, the patient has been referred to general surgery for further evaluation and treatment   Moderate Variability and Significant Decelerations

## 2022-12-10 NOTE — OB PROVIDER DELIVERY SUMMARY - NSPROVIDERDELIVERYNOTE_OBGYN_ALL_OB_FT
No significant intraperitoneal adhesive disease.   Viable female infant, vertex OA position. Weight 2860g, APGARs 9/9.   Normal appearing uterus, bilateral fallopian tubes and ovaries.   Bilateral salpingectomy performed with Ligasure.   \ No significant intraperitoneal adhesive disease.   Viable female infant, vertex OA position. Weight 2860g, APGARs 9/9.   Normal appearing uterus, bilateral fallopian tubes and ovaries.   Hysterotomy closed in 3 layers.   Bilateral salpingectomy performed with Ligasure.     Significant uterine atony noted. Patient received:    - Additional Pitocin IV x2   - IM Methergine x2    - Cytotec TX    - IM Hemabate x3    - TXA  Additional attending called into OR.   B-Hernandez Sutures placed.  Interval QBL: 1200cc.   STAT CBC and Coags obtained.    Uterus returned to abdomen, atony noted to improve.   Total of 3u PRBC transfused, 1u Cryo started intra-op.    QBL: 1547  IVF: 3u PRBC + 1u Cryo + 4500cc Crystalloid   UOP: 900cc     Dictation #: [id number]    Deb Chung MD, PGY4

## 2022-12-11 LAB
BASOPHILS # BLD AUTO: 0.04 K/UL — SIGNIFICANT CHANGE UP (ref 0–0.2)
BASOPHILS NFR BLD AUTO: 0.3 % — SIGNIFICANT CHANGE UP (ref 0–2)
EOSINOPHIL # BLD AUTO: 0.04 K/UL — SIGNIFICANT CHANGE UP (ref 0–0.5)
EOSINOPHIL NFR BLD AUTO: 0.3 % — SIGNIFICANT CHANGE UP (ref 0–6)
HCT VFR BLD CALC: 34.4 % — LOW (ref 34.5–45)
HGB BLD-MCNC: 10.7 G/DL — LOW (ref 11.5–15.5)
IANC: 9.65 K/UL — HIGH (ref 1.8–7.4)
IMM GRANULOCYTES NFR BLD AUTO: 2.5 % — HIGH (ref 0–0.9)
LYMPHOCYTES # BLD AUTO: 1.47 K/UL — SIGNIFICANT CHANGE UP (ref 1–3.3)
LYMPHOCYTES # BLD AUTO: 11 % — LOW (ref 13–44)
MCHC RBC-ENTMCNC: 23.3 PG — LOW (ref 27–34)
MCHC RBC-ENTMCNC: 31.1 GM/DL — LOW (ref 32–36)
MCV RBC AUTO: 74.9 FL — LOW (ref 80–100)
MONOCYTES # BLD AUTO: 1.83 K/UL — HIGH (ref 0–0.9)
MONOCYTES NFR BLD AUTO: 13.7 % — SIGNIFICANT CHANGE UP (ref 2–14)
NEUTROPHILS # BLD AUTO: 9.65 K/UL — HIGH (ref 1.8–7.4)
NEUTROPHILS NFR BLD AUTO: 72.2 % — SIGNIFICANT CHANGE UP (ref 43–77)
NRBC # BLD: 0 /100 WBCS — SIGNIFICANT CHANGE UP (ref 0–0)
NRBC # FLD: 0.06 K/UL — HIGH (ref 0–0)
PLATELET # BLD AUTO: 197 K/UL — SIGNIFICANT CHANGE UP (ref 150–400)
RBC # BLD: 4.59 M/UL — SIGNIFICANT CHANGE UP (ref 3.8–5.2)
RBC # FLD: 28.5 % — HIGH (ref 10.3–14.5)
WBC # BLD: 13.37 K/UL — HIGH (ref 3.8–10.5)
WBC # FLD AUTO: 13.37 K/UL — HIGH (ref 3.8–10.5)

## 2022-12-11 RX ORDER — DIPHENHYDRAMINE HCL 50 MG
25 CAPSULE ORAL EVERY 6 HOURS
Refills: 0 | Status: DISCONTINUED | OUTPATIENT
Start: 2022-12-11 | End: 2022-12-13

## 2022-12-11 RX ADMIN — Medication 975 MILLIGRAM(S): at 11:00

## 2022-12-11 RX ADMIN — Medication 600 MILLIGRAM(S): at 17:41

## 2022-12-11 RX ADMIN — Medication 975 MILLIGRAM(S): at 15:26

## 2022-12-11 RX ADMIN — Medication 975 MILLIGRAM(S): at 16:11

## 2022-12-11 RX ADMIN — Medication 600 MILLIGRAM(S): at 18:21

## 2022-12-11 RX ADMIN — SENNA PLUS 1 TABLET(S): 8.6 TABLET ORAL at 17:41

## 2022-12-11 RX ADMIN — Medication 975 MILLIGRAM(S): at 22:20

## 2022-12-11 RX ADMIN — SENNA PLUS 1 TABLET(S): 8.6 TABLET ORAL at 05:09

## 2022-12-11 RX ADMIN — Medication 325 MILLIGRAM(S): at 11:36

## 2022-12-11 RX ADMIN — Medication 600 MILLIGRAM(S): at 12:30

## 2022-12-11 RX ADMIN — Medication 975 MILLIGRAM(S): at 21:47

## 2022-12-11 RX ADMIN — HEPARIN SODIUM 5000 UNIT(S): 5000 INJECTION INTRAVENOUS; SUBCUTANEOUS at 02:15

## 2022-12-11 RX ADMIN — HEPARIN SODIUM 5000 UNIT(S): 5000 INJECTION INTRAVENOUS; SUBCUTANEOUS at 15:26

## 2022-12-11 RX ADMIN — Medication 600 MILLIGRAM(S): at 11:36

## 2022-12-11 RX ADMIN — Medication 1 TABLET(S): at 11:36

## 2022-12-11 RX ADMIN — Medication 600 MILLIGRAM(S): at 05:10

## 2022-12-11 RX ADMIN — Medication 975 MILLIGRAM(S): at 10:03

## 2022-12-11 RX ADMIN — Medication 25 MILLIGRAM(S): at 01:00

## 2022-12-11 RX ADMIN — Medication 600 MILLIGRAM(S): at 01:00

## 2022-12-12 PROBLEM — D50.9 IRON DEFICIENCY ANEMIA, UNSPECIFIED: Chronic | Status: ACTIVE | Noted: 2022-12-09

## 2022-12-12 RX ADMIN — SENNA PLUS 1 TABLET(S): 8.6 TABLET ORAL at 18:28

## 2022-12-12 RX ADMIN — Medication 975 MILLIGRAM(S): at 15:44

## 2022-12-12 RX ADMIN — Medication 975 MILLIGRAM(S): at 09:38

## 2022-12-12 RX ADMIN — HEPARIN SODIUM 5000 UNIT(S): 5000 INJECTION INTRAVENOUS; SUBCUTANEOUS at 03:50

## 2022-12-12 RX ADMIN — Medication 600 MILLIGRAM(S): at 06:30

## 2022-12-12 RX ADMIN — Medication 600 MILLIGRAM(S): at 05:51

## 2022-12-12 RX ADMIN — Medication 600 MILLIGRAM(S): at 18:28

## 2022-12-12 RX ADMIN — Medication 600 MILLIGRAM(S): at 11:25

## 2022-12-12 RX ADMIN — Medication 975 MILLIGRAM(S): at 03:48

## 2022-12-12 RX ADMIN — SENNA PLUS 1 TABLET(S): 8.6 TABLET ORAL at 05:51

## 2022-12-12 RX ADMIN — Medication 600 MILLIGRAM(S): at 23:49

## 2022-12-12 RX ADMIN — Medication 975 MILLIGRAM(S): at 10:37

## 2022-12-12 RX ADMIN — Medication 600 MILLIGRAM(S): at 00:04

## 2022-12-12 RX ADMIN — Medication 975 MILLIGRAM(S): at 04:29

## 2022-12-12 RX ADMIN — HEPARIN SODIUM 5000 UNIT(S): 5000 INJECTION INTRAVENOUS; SUBCUTANEOUS at 15:43

## 2022-12-12 RX ADMIN — Medication 325 MILLIGRAM(S): at 11:24

## 2022-12-12 RX ADMIN — Medication 600 MILLIGRAM(S): at 19:00

## 2022-12-12 RX ADMIN — Medication 600 MILLIGRAM(S): at 00:40

## 2022-12-12 RX ADMIN — Medication 975 MILLIGRAM(S): at 21:49

## 2022-12-12 RX ADMIN — Medication 975 MILLIGRAM(S): at 22:30

## 2022-12-12 RX ADMIN — Medication 1 TABLET(S): at 11:25

## 2022-12-12 RX ADMIN — Medication 600 MILLIGRAM(S): at 12:25

## 2022-12-12 RX ADMIN — Medication 975 MILLIGRAM(S): at 16:44

## 2022-12-13 VITALS
OXYGEN SATURATION: 100 % | SYSTOLIC BLOOD PRESSURE: 130 MMHG | HEART RATE: 85 BPM | TEMPERATURE: 98 F | DIASTOLIC BLOOD PRESSURE: 81 MMHG | RESPIRATION RATE: 18 BRPM

## 2022-12-13 RX ORDER — SENNA PLUS 8.6 MG/1
1 TABLET ORAL
Qty: 14 | Refills: 0
Start: 2022-12-13 | End: 2022-12-26

## 2022-12-13 RX ORDER — ASCORBIC ACID 60 MG
1 TABLET,CHEWABLE ORAL
Qty: 30 | Refills: 0
Start: 2022-12-13 | End: 2023-01-11

## 2022-12-13 RX ORDER — FERROUS SULFATE 325(65) MG
1 TABLET ORAL
Qty: 90 | Refills: 0
Start: 2022-12-13 | End: 2023-01-11

## 2022-12-13 RX ADMIN — Medication 325 MILLIGRAM(S): at 11:41

## 2022-12-13 RX ADMIN — Medication 975 MILLIGRAM(S): at 10:00

## 2022-12-13 RX ADMIN — Medication 600 MILLIGRAM(S): at 00:26

## 2022-12-13 RX ADMIN — Medication 600 MILLIGRAM(S): at 12:40

## 2022-12-13 RX ADMIN — Medication 1 TABLET(S): at 11:41

## 2022-12-13 RX ADMIN — Medication 975 MILLIGRAM(S): at 02:24

## 2022-12-13 RX ADMIN — HEPARIN SODIUM 5000 UNIT(S): 5000 INJECTION INTRAVENOUS; SUBCUTANEOUS at 02:24

## 2022-12-13 RX ADMIN — SENNA PLUS 1 TABLET(S): 8.6 TABLET ORAL at 05:15

## 2022-12-13 RX ADMIN — Medication 600 MILLIGRAM(S): at 06:00

## 2022-12-13 RX ADMIN — Medication 600 MILLIGRAM(S): at 11:41

## 2022-12-13 RX ADMIN — Medication 975 MILLIGRAM(S): at 03:00

## 2022-12-13 RX ADMIN — Medication 600 MILLIGRAM(S): at 05:15

## 2022-12-13 RX ADMIN — Medication 975 MILLIGRAM(S): at 09:05

## 2022-12-13 NOTE — PROGRESS NOTE ADULT - SUBJECTIVE AND OBJECTIVE BOX
OB Progress Note:  Delivery, POD#1    S: 37y POD#1 s/p rLTCS+BS complicated by PPH (QBL 1547 total) s/p 3U of pRBCS + 1U of cyroprecipitate. Pain controlled. Tolerating a regular diet and passing flatus. Denies n/v, chest pain, shortness of breath, or lightheadedness/dizziness. Ambulating (denies any light-headedness or dizziness with such). Meraz is in place    O:   Vital Signs Last 24 Hrs  T(C): 36.8 (11 Dec 2022 02:), Max: 37.5 (10 Dec 2022 07:15)  T(F): 98.3 (11 Dec 2022 02:19), Max: 99.5 (10 Dec 2022 07:15)  HR: 99 (11 Dec 2022 02:) (93 - 130)  BP: 113/66 (11 Dec 2022 02:) (109/49 - 133/76)  BP(mean): 77 (10 Dec 2022 15:30) (62 - 110)  RR: 18 (11 Dec 2022 02:) (15 - 27)  SpO2: 100% (11 Dec 2022 02:) (93% - 100%)    Parameters below as of 11 Dec 2022 02:19  Patient On (Oxygen Delivery Method): room air        Labs:  Blood type: B Positive  Rubella IgG: RPR: Negative                          10.8<L>   14.40<H> >-----------< 194    ( 12-10 @ 20:43 )             34.3<L>                        12.9   22.46<H> >-----------< 225    ( 12-10 @ 09:10 )             42.0                        14.8   23.93<H> >-----------< 238    ( 12-10 @ 03:33 )             48.4<H>                        2.3<LL>   3.23<L> >-----------< 62<L>    ( 12-10 @ 01:20 )             9.1<LL>                        9.4<L>   10.94<H> >-----------< 256    (  @ 21:10 )             33.2<L>    12-10-22 @ 20:43      136  |  106  |  10  ----------------------------<  105<H>  3.9   |  19<L>  |  0.65    12-10-22 @ 09:10      132<L>  |  103  |  7   ----------------------------<  96  4.3   |  17<L>  |  0.57    12-10-22 @ 03:33      136  |  109<H>  |  7   ----------------------------<  124<H>  4.5   |  18<L>  |  0.50        Ca    8.5      10 Dec 2022 20:43  Ca    8.3<L>      10 Dec 2022 09:10  Ca    8.7      10 Dec 2022 03:33    TPro  5.4<L>  /  Alb  2.8<L>  /  TBili  0.4  /  DBili  x   /  AST  35<H>  /  ALT  14  /  AlkPhos  131<H>  12-10-22 @ 20:43  TPro  4.9<L>  /  Alb  2.5<L>  /  TBili  0.6  /  DBili  x   /  AST  31  /  ALT  14  /  AlkPhos  132<H>  12-10-22 @ 09:10  TPro  5.0<L>  /  Alb  2.7<L>  /  TBili  0.7  /  DBili  x   /  AST  29  /  ALT  13  /  AlkPhos  151<H>  12-10-22 @ 03:33          PE:  General: No acute distress. Resting comfortably prior to eval  Pulm: Unlabored breathing. No respiratory distress  Abdomen: Soft. Non-tender. Incision c/d/i   : Pad w/o blood. Meraz w/ clear yellow urine   Extremities: No calf tenderness bilaterally     
POST OP DAY  1  s/p   SECTION    SUBJECTIVE:  I'm ok.    PAIN SCALE SCORE: [x] Refer to charted pain scores    THERAPY:  [ x ] Spinal morphine   [  ] Epidural morphine   [  ] IV PCA Hydromorphone 1 mg/ml    OBJECTIVE:  Comfortable Appearing    SEDATION SCORE:	  [ x ] Alert	    [  ] Drowsy        [  ] Arousable	[  ] Asleep	[  ] Unresponsive    Side Effects:	  [ x ] None	     [  ] Nausea        [  ] Pruritus        [  ] Weakness   [  ] Numbness        ASSESSMENT/ PLAN   [   ] Discontinue         [  ] Continue    [ x ] Change to prn Analgesics as per primary service.    DOCUMENTATION & VERIFICATION OF CURRENT MEDS [ x ] Done    COMMENTS: No Headache.  
Postpartum Note,  Section  She is a  37y woman who is now post-operative day: 1    Subjective:  The patient feels well.  She is not ambulating.   She is tolerating regular diet.  She denies nausea and vomiting but no flatus  She has villarreal.  Her pain is controlled.  She reports normal postpartum bleeding.        Physical exam:    Vital Signs Last 24 Hrs  T(C): 36.8 (11 Dec 2022 05:56), Max: 37.5 (10 Dec 2022 07:15)  T(F): 98.3 (11 Dec 2022 05:56), Max: 99.5 (10 Dec 2022 07:15)  HR: 95 (11 Dec 2022 05:56) (93 - 112)  BP: 116/60 (11 Dec 2022 05:56) (109/49 - 133/76)  BP(mean): 77 (10 Dec 2022 15:30) (62 - 110)  RR: 18 (11 Dec 2022 05:56) (15 - 27)  SpO2: 98% (11 Dec 2022 05:56) (93% - 100%)    Parameters below as of 11 Dec 2022 05:56  Patient On (Oxygen Delivery Method): room air        Gen: NAD  Breast: Soft, nontender, not engorged.  Abdomen: Soft, nontender, no distension , firm uterine fundus at umbilicus, tympanic c/w air noted  Incision: Clean, dry, and intact   Pelvic: Normal lochia noted  Ext: No calf tenderness    LABS:                        10.8   14.40 )-----------( 194      ( 10 Dec 2022 20:43 )             34.3                         12.9   22.46 )-----------( 225      ( 10 Dec 2022 09:10 )             42.0                         14.8   23.93 )-----------( 238      ( 10 Dec 2022 03:33 )             48.4                         2.3    3.23  )-----------( 62       ( 10 Dec 2022 01:20 )             9.1                          9.4    10.94 )-----------( 256      ( 09 Dec 2022 21:10 )             33.2       12-10-22 @ 20:43      136  |  106  |  10  ----------------------------<  105<H>  3.9   |  19<L>  |  0.65    12-10-22 @ 09:10      132<L>  |  103  |  7   ----------------------------<  96  4.3   |  17<L>  |  0.57    12-10-22 @ 03:33      136  |  109<H>  |  7   ----------------------------<  124<H>  4.5   |  18<L>  |  0.50        Ca    8.5      10 Dec 2022 20:43  Ca    8.3<L>      10 Dec 2022 09:10  Ca    8.7      10 Dec 2022 03:33    TPro  5.4<L>  /  Alb  2.8<L>  /  TBili  0.4  /  DBili  x   /  AST  35<H>  /  ALT  14  /  AlkPhos  131<H>  12-10-22 @ 20:43  TPro  4.9<L>  /  Alb  2.5<L>  /  TBili  0.6  /  DBili  x   /  AST  31  /  ALT  14  /  AlkPhos  132<H>  12-10-22 @ 09:10  TPro  5.0<L>  /  Alb  2.7<L>  /  TBili  0.7  /  DBili  x   /  AST  29  /  ALT  13  /  AlkPhos  151<H>  12-10-22 @ 03:33        Allergies    No Known Allergies    Intolerances      MEDICATIONS  (STANDING):  acetaminophen     Tablet .. 975 milliGRAM(s) Oral <User Schedule>  diphtheria/tetanus/pertussis (acellular) Vaccine (Adacel) 0.5 milliLiter(s) IntraMuscular once  ferrous    sulfate 325 milliGRAM(s) Oral daily  heparin   Injectable 5000 Unit(s) SubCutaneous every 12 hours  ibuprofen  Tablet. 600 milliGRAM(s) Oral every 6 hours  influenza   Vaccine 0.5 milliLiter(s) IntraMuscular once  lactated ringers. 1000 milliLiter(s) (75 mL/Hr) IV Continuous <Continuous>  lactated ringers. 1000 milliLiter(s) (125 mL/Hr) IV Continuous <Continuous>  oxytocin Infusion 333.333 milliUNIT(s)/Min (1000 mL/Hr) IV Continuous <Continuous>  prenatal multivitamin 1 Tablet(s) Oral daily  senna 1 Tablet(s) Oral two times a day    MEDICATIONS  (PRN):  diphenhydrAMINE 25 milliGRAM(s) Oral every 6 hours PRN Pruritus  lanolin Ointment 1 Application(s) Topical every 6 hours PRN Sore Nipples  magnesium hydroxide Suspension 30 milliLiter(s) Oral two times a day PRN Constipation  oxyCODONE    IR 5 milliGRAM(s) Oral every 3 hours PRN Moderate to Severe Pain (4-10)  simethicone 80 milliGRAM(s) Chew every 4 hours PRN Gas        Assessment and Plan  POD #1 s/p  section/ BS BABAK sp PRBC  CBC 6A pending  if cbc stable will dc villarreal  Encourage ambulation even with the villarreal catheter  continue care          
Postpartum Note,  Section  She is a  37y woman who is now post-operative day: 2    Subjective:  The patient feels well.  She is ambulating.   She is tolerating regular diet.  She denies nausea and vomiting.  She is voiding.  Her pain is controlled.  She reports normal postpartum bleeding.        Physical exam:    Vital Signs Last 24 Hrs  T(C): 36.7 (12 Dec 2022 05:37), Max: 37 (11 Dec 2022 14:01)  T(F): 98.1 (12 Dec 2022 05:37), Max: 98.6 (11 Dec 2022 14:01)  HR: 90 (12 Dec 2022 05:37) (85 - 100)  BP: 108/55 (12 Dec 2022 05:37) (108/55 - 121/66)  BP(mean): --  RR: 18 (12 Dec 2022 05:37) (18 - 18)  SpO2: 99% (12 Dec 2022 05:37) (99% - 100%)    Parameters below as of 12 Dec 2022 05:37  Patient On (Oxygen Delivery Method): room air        Gen: NAD  Breast: Soft, nontender, not engorged.  Abdomen: Soft, nontender, no distension , firm uterine fundus at umbilicus.  Incision: Clean, dry, and intact   Pelvic: Normal lochia noted  Ext: No calf tenderness    LABS:                        10.7   13.37 )-----------( 197      ( 11 Dec 2022 07:10 )             34.4                         10.8   14.40 )-----------( 194      ( 10 Dec 2022 20:43 )             34.3                         12.9   22.46 )-----------( 225      ( 10 Dec 2022 09:10 )             42.0       12-10-22 @ 20:43      136  |  106  |  10  ----------------------------<  105<H>  3.9   |  19<L>  |  0.65    12-10-22 @ 09:10      132<L>  |  103  |  7   ----------------------------<  96  4.3   |  17<L>  |  0.57    12-10-22 @ 03:33      136  |  109<H>  |  7   ----------------------------<  124<H>  4.5   |  18<L>  |  0.50        Ca    8.5      10 Dec 2022 20:43  Ca    8.3<L>      10 Dec 2022 09:10  Ca    8.7      10 Dec 2022 03:33    TPro  5.4<L>  /  Alb  2.8<L>  /  TBili  0.4  /  DBili  x   /  AST  35<H>  /  ALT  14  /  AlkPhos  131<H>  12-10-22 @ 20:43  TPro  4.9<L>  /  Alb  2.5<L>  /  TBili  0.6  /  DBili  x   /  AST  31  /  ALT  14  /  AlkPhos  132<H>  12-10-22 @ 09:10  TPro  5.0<L>  /  Alb  2.7<L>  /  TBili  0.7  /  DBili  x   /  AST  29  /  ALT  13  /  AlkPhos  151<H>  12-10-22 @ 03:33        Allergies    No Known Allergies    Intolerances      MEDICATIONS  (STANDING):  acetaminophen     Tablet .. 975 milliGRAM(s) Oral <User Schedule>  diphtheria/tetanus/pertussis (acellular) Vaccine (Adacel) 0.5 milliLiter(s) IntraMuscular once  ferrous    sulfate 325 milliGRAM(s) Oral daily  heparin   Injectable 5000 Unit(s) SubCutaneous every 12 hours  ibuprofen  Tablet. 600 milliGRAM(s) Oral every 6 hours  influenza   Vaccine 0.5 milliLiter(s) IntraMuscular once  lactated ringers. 1000 milliLiter(s) (75 mL/Hr) IV Continuous <Continuous>  prenatal multivitamin 1 Tablet(s) Oral daily  senna 1 Tablet(s) Oral two times a day    MEDICATIONS  (PRN):  diphenhydrAMINE 25 milliGRAM(s) Oral every 6 hours PRN Pruritus  lanolin Ointment 1 Application(s) Topical every 6 hours PRN Sore Nipples  magnesium hydroxide Suspension 30 milliLiter(s) Oral two times a day PRN Constipation  oxyCODONE    IR 5 milliGRAM(s) Oral every 3 hours PRN Moderate to Severe Pain (4-10)  simethicone 80 milliGRAM(s) Chew every 4 hours PRN Gas        Assessment and Plan  POD #2 s/p  section  Doing well.  Encourage ambulation.        
OB Progress Note:  Delivery, POD#3    S: 37y POD#3 s/p rLTCS+BS complicated by PPH (QBL 1547 total) s/p 3U of pRBCS + 1U of cyroprecipitate. Pain controlled. Tolerating a regular diet and passing flatus, voiding independently and reports of having a bowel movement. Denies n/v, chest pain, shortness of breath, or lightheadedness/dizziness. Ambulating (denies any light-headedness or dizziness with such).     O:   Vital Signs Last 24 Hrs  T(C): 36.7 (13 Dec 2022 05:47), Max: 37.1 (12 Dec 2022 14:00)  T(F): 98 (13 Dec 2022 05:47), Max: 98.8 (12 Dec 2022 14:00)  HR: 91 (13 Dec 2022 05:47) (6 - 91)  BP: 123/69 (13 Dec 2022 05:47) (118/50 - 127/72)  BP(mean): --  RR: 18 (13 Dec 2022 05:47) (18 - 18)  SpO2: 100% (13 Dec 2022 05:47) (100% - 100%)  Parameters below as of 13 Dec 2022 05:47  Patient On (Oxygen Delivery Method): room air    Labs:  Blood type: B Positive  Rubella IgG: RPR: Negative                           10.8<L>   14.40<H> >-----------< 194    ( 12-10 @ 20:43 )             34.3<L>                        12.9   22.46<H> >-----------< 225    ( 12-10 @ 09:10 )             42.0                        14.8   23.93<H> >-----------< 238    ( 12-10 @ 03:33 )             48.4<H>                        2.3<LL>   3.23<L> >-----------< 62<L>    ( 12-10 @ 01:20 )             9.1<LL>                        9.4<L>   10.94<H> >-----------< 256    (  @ 21:10 )             33.2<L>    12-10-22 @ 20:43      136  |  106  |  10  ----------------------------<  105<H>  3.9   |  19<L>  |  0.65    12-10-22 @ 09:10      132<L>  |  103  |  7   ----------------------------<  96  4.3   |  17<L>  |  0.57    12-10-22 @ 03:33      136  |  109<H>  |  7   ----------------------------<  124<H>  4.5   |  18<L>  |  0.50        Ca    8.5      10 Dec 2022 20:43  Ca    8.3<L>      10 Dec 2022 09:10  Ca    8.7      10 Dec 2022 03:33    TPro  5.4<L>  /  Alb  2.8<L>  /  TBili  0.4  /  DBili  x   /  AST  35<H>  /  ALT  14  /  AlkPhos  131<H>  12-10-22 @ 20:43  TPro  4.9<L>  /  Alb  2.5<L>  /  TBili  0.6  /  DBili  x   /  AST  31  /  ALT  14  /  AlkPhos  132<H>  12-10-22 @ 09:10  TPro  5.0<L>  /  Alb  2.7<L>  /  TBili  0.7  /  DBili  x   /  AST  29  /  ALT  13  /  AlkPhos  151<H>  12-10-22 @ 03:33          Physical Exam:  General: No acute distress, sitting by the bedside holding   Abdomen: Soft. Non-tender. Incision clean, dry and intact  Fundus below the umbilicus small amount of lochia present on pad  Extremities: No calf tenderness bilaterally, pedal pulses palpable, bilateral edema present

## 2022-12-13 NOTE — PROGRESS NOTE ADULT - ASSESSMENT
Assessment and Plan:   	  A/P: 37y POD#3 s/p rLTCS+BS complicated by PPH (QBL 1547 total) s/p 3U of pRBCS + 1U of cryoprecipitate  - Continue regular diet.  - Encourage ambulation as tolerated   - Continue motrin, tylenol, oxycodone PRN for pain control  - Patient request for Senna, Iron/Vitamin C to be sent patient's pharmacy, sent as per request  - discharge in progress, calling  to for discharge clearance
A/P: 37y POD#1 s/p rLTCS+BS complicated by PPH (QBL 1547 total) s/p 3U of pRBCS + 1U of cryoprecipitate Pt is stable and progressing appropriately at this time   - Continue regular diet.  - Encourage ambulation as tolerated   - Continue motrin, tylenol, oxycodone PRN for pain control.    #PPH  - Since 7p shift, pt has put of 1350cc of UOP. Will consider AM d/c of phil after labs result  - Asx at this time  - AM CBC pending  - Most recent CBC w/ H/H of 10.8/34.3 @ 2043 (12/10)  - VS reassuring at this time     Geo Mccarthy, PGY-1  Ob/Gyn

## 2022-12-13 NOTE — PROGRESS NOTE ADULT - PROBLEM SELECTOR PLAN 1
37y POD#3 s/p rLTCS+BS complicated by PPH (QBL 1547 total) s/p 3U of pRBCS + 1U of cryoprecipitate  - Continue regular diet.  - Encourage ambulation as tolerated   - Continue motrin, tylenol, oxycodone PRN for pain control  - Patient request for Senna, Iron/Vitamin C to be sent patient's pharmacy, sent as per request  - discharge in progress, calling  to for discharge clearance

## 2022-12-21 LAB — SURGICAL PATHOLOGY STUDY: SIGNIFICANT CHANGE UP

## 2023-04-03 ENCOUNTER — RX RENEWAL (OUTPATIENT)
Age: 38
End: 2023-04-03

## 2023-05-23 NOTE — OB RN TRIAGE NOTE - NSICDXPASTMEDICALHX_GEN_ALL_CORE_FT
Rhofade Pregnancy And Lactation Text: This medication has not been assigned a Pregnancy Risk Category. It is unknown if the medication is excreted in breast milk. 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